# Patient Record
Sex: FEMALE | Race: WHITE | NOT HISPANIC OR LATINO | Employment: OTHER | URBAN - METROPOLITAN AREA
[De-identification: names, ages, dates, MRNs, and addresses within clinical notes are randomized per-mention and may not be internally consistent; named-entity substitution may affect disease eponyms.]

---

## 2017-09-18 ENCOUNTER — HISTORICAL (OUTPATIENT)
Dept: ADMINISTRATIVE | Facility: HOSPITAL | Age: 62
End: 2017-09-18

## 2017-09-20 LAB — FINAL CULTURE: NO GROWTH

## 2018-01-11 ENCOUNTER — HISTORICAL (OUTPATIENT)
Dept: ADMINISTRATIVE | Facility: HOSPITAL | Age: 63
End: 2018-01-11

## 2021-11-30 ENCOUNTER — HISTORICAL (OUTPATIENT)
Dept: ADMINISTRATIVE | Facility: HOSPITAL | Age: 66
End: 2021-11-30

## 2021-11-30 LAB
CHOLEST SERPL-MCNC: 155 MG/DL (ref 0–200)
CHOLEST/HDLC SERPL: 3 {RATIO}
HDLC SERPL-MCNC: 52 MG/DL (ref 35–60)
LDLC SERPL CALC-MCNC: 72 MG/DL (ref 0–129)
TRIGL SERPL-MCNC: 118 MG/DL (ref 30–150)
VLDLC SERPL CALC-MCNC: 23.6 MG/DL

## 2022-04-07 ENCOUNTER — HISTORICAL (OUTPATIENT)
Dept: ADMINISTRATIVE | Facility: HOSPITAL | Age: 67
End: 2022-04-07

## 2022-04-23 VITALS
OXYGEN SATURATION: 97 % | HEIGHT: 65 IN | BODY MASS INDEX: 28.57 KG/M2 | WEIGHT: 171.5 LBS | SYSTOLIC BLOOD PRESSURE: 130 MMHG | DIASTOLIC BLOOD PRESSURE: 82 MMHG

## 2022-05-01 NOTE — HISTORICAL [facility]
This is a historical note converted from Nathan. Formatting and pictures may have been removed.  Please reference Nathan for original formatting and attached multimedia. Chief Complaint  CPX/FASTING/PRE-OP CLEARANCE  History of Present Illness  Patient presents for wellness examination as well as preoperative clearance for cataract surgery.  She continues to have back pain.  She complains of achy joints all over and muscle aches.  She sleeps better; she takes 1/2 Xanax some times. Her hot flashes are much better.  Appetite is too good.  She has alcohol once to twice a year.  She has decreased tobacco to less 1/2?pack per day.  Colonoscopy: Dr Kimberly Marcos, 2018, follow up 5 years.  She does not exercise.  Oncologist: Dr Euceda; last office visit 11/2021. Next appointment February 2021.  Cardio: Dr Larsen; has echo scheduled for February.  Review of Systems  Constitutional:??no?weight gain,??no?weight loss,??no?fatigue, ?no?fever, ?no?chills, ?no?weakness, ?no?trouble sleeping  Eyes: ?no?vision loss/changes,??no?glasses or contacts,??no?pain,??no?redness,??no?blurry or double vision,??no?flashing lights,??no?glaucoma,??+?cataracts: need bilateral surgery  Last eye exam:? has right?cataract surgery scheduled for Monday  Neck: ?no?lymphadenopathy,??no?thyroid abnormalities,??no?bruits,??no?stiffness  Ears:??no?decreased hearing,??no?tinnitus,??no?earache,??no?drainage?  Nose:??no?congestion,??+?rhinorrhea, ?no?epistaxis,??no?sinus pressure, + allergies  Throat/Oral:??+sore throat,??no?hoarseness, ?no?dental caries,??no?gum bleeding,??no?oral lesions  Cardiovascular:??no?chest pain, palpitations, or tightness,??no?dyspnea with exertion,??no?orthopnea,??no?paroxysmal nocturnal dyspnea, + hypertension, + hypercholesteremia  Respiratory:??no?cough,??no?sputum,??no?hemoptysis,??no?dyspnea,??no?wheezing,??no?pleuritic chest pain?  Gastrointestinal:??no?abdominal  pain,??no?nausea,??no?vomiting,??no?heartburn,??no?dysphagia or odynophagia,??no?diarrhea,??no?constipation,??no?melena,??no?hematochezia,?no?jaundice  Urinary:??no?frequency,??no?urgency,??no?burning or pain,?no?hematuria,??no?incontinence,??no?hesitancy,??no?incomplete voiding,??no?flank pain,??no?nocturia  Musculoskeletal:?+?myalgias,??+?arthralgias; elbow, wrists, fingers, ankles, no?neck pain,??no?back pain,??+?swelling of extremities: left foot, h/o recurrent deep vein thrombosis  Skin:?no?rashes,??no?sores,??no?non-healing wounds  Neurologic:??no?headaches,??no?dizziness/lightheadedness,??no?tremors,??no?paresthesias,??no?seizures,??no?muscle weakness  Psychiatric:??no?depression/sadness,??no?anhedonia,??no?irritability,??no?suicidal ideations,??+?anxiety,??no?panic attacks  Endocrine:??no?hot or cold intolerance,??no?sweating,??no?polyuria,??no?polydipsia,??no?polyphagia  Hematologic:??no?bruising,??no?bleeding disorders, recurrent breast cancer, h/o recurrent deep venous thromboses  Physical Exam  Vitals & Measurements  T:?36.8? ?C (Oral)? HR:?76(Peripheral)? BP:?130/82? SpO2:?97%?  HT:?165.1?cm? HT:?165.10?cm? WT:?77.8?kg? WT:?77.800?kg? BMI:?28.54?  PHYSICAL EXAMINATION:  GENERAL: The patient is a well-developed, well-nourished white?female in no?apparent distress. She is alert and oriented x 4.  HEENT: Head is normocephalic and atraumatic. Extraocular muscles are intact. Pupils are equal, round, and reactive to light and accommodation. Nares: edematous mucosa/turbinates.??Mouth is well hydrated and without lesions. Mucous membranes are moist. Posterior pharynx clear of any exudate or lesions.  NECK: Supple. No carotid bruits.? No lymphadenopathy or thyromegaly.  LUNGS: Clear to auscultation.  HEART: Regular rate and rhythm without murmur, gallops or rubs.  ABDOMEN: Soft, nontender, and nondistended.? Positive bowel sounds.? No hepatosplenomegaly was noted.  EXTREMITIES: Without any cyanosis, clubbing,  rash, lesions or edema.  NEUROLOGIC: Cranial nerves II through XII are grossly intact.? No motor or sensory deficits.? Cerebellar function intact.  SKIN: No ulceration or induration present.  ?  ?  Assessment/Plan  1.?Wellness examination?Z00.00  Patient presents for wellness examination.  She also needs preoperative clearance for bilateral cataract surgery; she will have her right eye done?next week?and her left eye done in January.  Patient is followed regularly by Dr. Euceda.  Patient is on lifetime?anticoagulant therapy with Coumadin.  Patients cardiac status is followed by .  Patient declines any/all vaccinations.  Lipid profile pending.  Ordered:  Clinic Follow-Up Wellness, *Est. 11/30/22 3:00:00 CST, Order for future visit, Wellness examination, [facility]  Medicare Annual Wellness- Subsequent  PC, Wellness examination  Hypertension  Hypercholesteremia  Metastatic breast cancer  History of recurrent deep vein thrombosis (DVT)  Anxiety  Tobacco user, [facility] - [facility], 11/30/21 16:19:00 CST  ?  2.?Hypertension?I10  Well-controlled; continue current medications.  Ordered:  Medicare Annual Wellness- Subsequent  PC, Wellness examination  Hypertension  Hypercholesteremia  Metastatic breast cancer  History of recurrent deep vein thrombosis (DVT)  Anxiety  Tobacco user, [facility] - [facility], 11/30/21 16:19:00 CST  ?  3.?Hypercholesteremia?E78.00  ?Patient has been compliant with medication.  Lipid profile pending.  We will have patient hold her medication for 1 month and see if this improves her?aches and pains.  Ordered:  Lipid Panel, Routine collect, 11/30/21 16:26:00 CST, Blood, Stop date 11/30/21 16:26:00 CST, Lab Collect, Hypercholesteremia, 11/30/21 16:26:00 CST  Medicare Annual Wellness- Subsequent  PC, Wellness examination  Hypertension  Hypercholesteremia  Metastatic breast cancer  History of recurrent deep vein thrombosis (DVT)  Anxiety  Tobacco user, [facility] - [facility],  11/30/21 16:19:00 CST  ?  4.?Metastatic breast cancer?C50.919  Followed by .  She is on?letrozole.  She is currently off Herceptin;?heart function is being followed by Dr. Larsen. ?She will have an echo?cardiogram in February.? Results of echocardiogram?will determine if Herceptin is reinstituted.  Recent MRIs of her thoracic and lumbar spine?reveal?degenerative changes?and?loss of?vertebral height consistent with osteoporosis.  Ordered:  Medicare Annual Wellness- Subsequent  PC, Wellness examination  Hypertension  Hypercholesteremia  Metastatic breast cancer  History of recurrent deep vein thrombosis (DVT)  Anxiety  Tobacco user, [facility] - [facility], 11/30/21 16:19:00 CST  ?  5.?History of recurrent deep vein thrombosis (DVT)?Z86.718  ?Patient continues to be?on Coumadin;?she will be on lifetime therapy.  Ordered:  Medicare Annual Wellness- Subsequent  PC, Wellness examination  Hypertension  Hypercholesteremia  Metastatic breast cancer  History of recurrent deep vein thrombosis (DVT)  Anxiety  Tobacco user, [facility] - [facility], 11/30/21 16:19:00 CST  ?  6.?Anxiety?F41.9  All things considered, patient feels she is doing well.  Ordered:  Medicare Annual Wellness- Subsequent  PC, Wellness examination  Hypertension  Hypercholesteremia  Metastatic breast cancer  History of recurrent deep vein thrombosis (DVT)  Anxiety  Tobacco user, [facility] - [facility], 11/30/21 16:19:00 CST  ?  7.?Tobacco user?Z72.0  ?Patient encouraged to?decrease/stop usage.  Long-term risks of tobacco usage discussed with patient.  Ordered:  Medicare Annual Wellness- Subsequent  PC, Wellness examination  Hypertension  Hypercholesteremia  Metastatic breast cancer  History of recurrent deep vein thrombosis (DVT)  Anxiety  Tobacco user, [facility] - [facility], 11/30/21 16:19:00 CST  ?  8.?Immunization refused?Z28.21  ?Patient declines any/all vaccinations; benefits/potential risk/side effects discussed with  patient.  ?  Orders:  alPRAzolam, See Instructions, TAKE 1 TABLET BY MOUTH EVERY NIGHT AT BEDTIME AS NEEDED, # 30 tab(s), 5 Refill(s), Pharmacy: [id number], 165.1, cm, Height/Length Dosing, 11/30/21 15:29:00 CST, 77.8, kg, Weight Dosing, 11/30/21 15:29:00 CST  lisinopril, 20 mg = 1 tab(s), Oral, Daily, # 90 tab(s), 3 Refill(s), Pharmacy: [id number], 165.1, cm, Height/Length Dosing, 11/30/21 15:29:00 CST, 77.8, kg, Weight Dosing, 11/30/21 15:29:00 CST  metoprolol, 100 mg = 1 tab(s), Oral, Daily, # 90 tab(s), 3 Refill(s), Pharmacy: [id number], 165.1, cm, Height/Length Dosing, 11/30/21 15:29:00 CST, 77.8, kg, Weight Dosing, 11/30/21 15:29:00 CST  rosuvastatin, See Instructions, Take 1 tablet by mouth daily., # 90 tab(s), 3 Refill(s), Pharmacy: [id number], 165.1, cm, Height/Length Dosing, 11/30/21 15:29:00 CST, 77.8, kg, Weight Dosing, 11/30/21 15:29:00 CST  Referrals  Clinic Follow-Up Wellness, *Est. 11/30/22 3:00:00 CST, Order for future visit, Wellness examination, [facility]   Problem List/Past Medical History  Ongoing  Anxiety  History of recurrent deep vein thrombosis (DVT)  Hypercholesteremia  Hypertension  Immunization refused  Metastatic breast cancer  Historical  No qualifying data  Procedure/Surgical History  Colonoscopy (04/01/2018)  Biopsy of bone (02.2018)  Bilateral oophorectomy (2018)   Medications  alPRAzolam 0.5 mg oral tablet, See Instructions, 5 refills  gabapentin 400 mg oral capsule, 400 mg= 1 cap(s), Oral, TID  letrozole 2.5 mg oral tablet, 2.5 mg= 1 tab(s), Oral, Daily  lisinopril 20 mg oral tablet, 20 mg= 1 tab(s), Oral, Daily, 3 refills  Metoprolol Succinate  mg oral tablet extended release, 100 mg= 1 tab(s), Oral, Daily, 3 refills  rosuvastatin 20 mg oral tablet, See Instructions, 3 refills  WARFARIN SODIUM 4 MG TABLET, 4 mg= 1 tab(s), Oral, qPM  Allergies  No Known Medication Allergies  Social History  Abuse/Neglect  No, 11/30/2021  No,  09/16/2020  Alcohol  Current, Liquor, 1-2 times per year, 11/30/2021  Current, 1-2 times per year, 09/16/2020  Employment/School  Work/School description: BUSINESS OWNER., 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 Living situation: Home/Independent. Monitoring, 11/30/2021  Lives with Spouse. Living situation: Home/Independent., 09/16/2020  Nutrition/Health  Regular, Good, 11/30/2021  Regular, Good, 09/16/2020  Substance Use  Never, 11/30/2021  Current, CBD Gummies, Daily, 09/16/2020  Tobacco  5-9 cigarettes (between 1/4 to 1/2 pack)/day in last 30 days, No, 11/30/2021  10 or more cigarettes (1/2 pack or more)/day in last 30 days, Cigarettes, No, 10 per day. 29 Years (Age started)., 09/16/2020  Family History  Prostate cancer: Father.  Health Maintenance  Health Maintenance  ???Pending?(in the next year)  ??? ??OverDue  ??? ? ? ?Smoking Cessation due??01/01/21??Variable frequency  ??? ? ? ?Advance Directive due??01/02/21??and every 1??year(s)  ??? ? ? ?Alcohol Misuse Screening due??01/02/21??and every 1??year(s)  ??? ? ? ?Cognitive Screening due??01/02/21??and every 1??year(s)  ??? ? ? ?Fall Risk Assessment due??01/02/21??and every 1??year(s)  ??? ? ? ?Functional Assessment due??01/02/21??and every 1??year(s)  ??? ??Due?  ??? ? ? ?ADL Screening due??11/30/21??and every 1??year(s)  ??? ? ? ?Aspirin Therapy for CVD Prevention due??11/30/21??and every 1??year(s)  ??? ? ? ?Bone Density Screening due??11/30/21??Variable frequency  ??? ? ? ?Breast Cancer Screening due??11/30/21??Unknown Frequency  ??? ? ? ?Depression Screening due??11/30/21??Unknown Frequency  ??? ? ? ?Diabetes Screening due??11/30/21??Unknown Frequency  ??? ? ? ?Hypertension Management-Education due??11/30/21??and every 1??year(s)  ??? ? ? ?Hypertension Management-BMP due??11/30/21??Unknown Frequency  ??? ? ? ?Lung Cancer Screening due??11/30/21??and every  1??year(s)  ??? ? ? ?Pneumococcal Vaccine due??11/30/21??Unknown Frequency  ??? ? ? ?Tetanus Vaccine due??11/30/21??and every 10??year(s)  ??? ? ? ?Zoster Vaccine due??11/30/21??Unknown Frequency  ??? ??Due In Future?  ??? ? ? ?Obesity Screening not due until??01/01/22??and every 1??year(s)  ???Satisfied?(in the past 1 year)  ??? ??Satisfied?  ??? ? ? ?Blood Pressure Screening on??11/30/21.??Satisfied by Butch Stokes LPN  ??? ? ? ?Body Mass Index Check on??11/30/21.??Satisfied by Butch Stokes LPN  ??? ? ? ?Hypertension Management-Blood Pressure on??11/30/21.??Satisfied by Butch Stokes LPN  ??? ? ? ?Influenza Vaccine on??11/30/21.??Satisfied by Butch Stokes LPN  ??? ? ? ?Medicare Annual Wellness Exam on??11/30/21.??Satisfied by Chi Godwin MD  ??? ? ? ?Obesity Screening on??11/30/21.??Satisfied by Butch Stokes LPN  ?

## 2022-11-10 ENCOUNTER — TELEPHONE (OUTPATIENT)
Dept: NEUROSURGERY | Facility: CLINIC | Age: 67
End: 2022-11-10
Payer: MEDICARE

## 2022-11-10 DIAGNOSIS — G93.89 CEREBELLAR MASS: Primary | ICD-10-CM

## 2022-11-10 NOTE — TELEPHONE ENCOUNTER
She needs to be seen next week.  Either in [facility]'s schedule or mine on Wednesday.  She has cervical complaints.  Cervical MRI was obtained.  Please get that as well.

## 2022-11-10 NOTE — TELEPHONE ENCOUNTER
----- Message from Ellie Lainez MA sent at 11/10/2022 11:46 AM CST -----  Regarding: REFERRAL PROCESS-BRAIN METS  Patient was referred to Dr. Gomez by Dr. Euceda for brain mets. Imaging is in Epic, please review for severity and let me know where to schedule. Thanks!

## 2022-11-14 ENCOUNTER — OFFICE VISIT (OUTPATIENT)
Dept: NEUROSURGERY | Facility: CLINIC | Age: 67
End: 2022-11-14
Payer: MEDICARE

## 2022-11-14 VITALS
BODY MASS INDEX: 24.27 KG/M2 | WEIGHT: 151 LBS | DIASTOLIC BLOOD PRESSURE: 76 MMHG | SYSTOLIC BLOOD PRESSURE: 124 MMHG | HEIGHT: 66 IN | HEART RATE: 77 BPM

## 2022-11-14 DIAGNOSIS — G93.89 CEREBELLAR MASS: ICD-10-CM

## 2022-11-14 PROCEDURE — 99205 OFFICE O/P NEW HI 60 MIN: CPT | Mod: ,,, | Performed by: NEUROLOGICAL SURGERY

## 2022-11-14 PROCEDURE — 99205 PR OFFICE/OUTPT VISIT, NEW, LEVL V, 60-74 MIN: ICD-10-PCS | Mod: ,,, | Performed by: NEUROLOGICAL SURGERY

## 2022-11-14 RX ORDER — LISINOPRIL 20 MG/1
20 TABLET ORAL DAILY
COMMUNITY
Start: 2021-11-30 | End: 2022-12-19

## 2022-11-14 RX ORDER — FERROUS SULFATE, DRIED 160(50) MG
1 TABLET, EXTENDED RELEASE ORAL DAILY
COMMUNITY

## 2022-11-14 RX ORDER — NIRMATRELVIR AND RITONAVIR 300-100 MG
KIT ORAL
COMMUNITY
Start: 2022-07-12 | End: 2022-11-29 | Stop reason: CLARIF

## 2022-11-14 RX ORDER — METOPROLOL SUCCINATE 25 MG/1
25 TABLET, EXTENDED RELEASE ORAL DAILY
COMMUNITY
End: 2023-06-30 | Stop reason: SDUPTHER

## 2022-11-14 RX ORDER — LORATADINE 10 MG
10 TABLET,DISINTEGRATING ORAL DAILY
COMMUNITY

## 2022-11-14 RX ORDER — ROSUVASTATIN CALCIUM 20 MG/1
20 TABLET, COATED ORAL DAILY
COMMUNITY
Start: 2021-11-30 | End: 2022-11-29 | Stop reason: CLARIF

## 2022-11-14 RX ORDER — WARFARIN 4 MG/1
4 TABLET ORAL DAILY
COMMUNITY
Start: 2022-06-20

## 2022-11-14 RX ORDER — DEXAMETHASONE 4 MG/1
1 TABLET ORAL EVERY 8 HOURS
Status: ON HOLD | COMMUNITY
Start: 2022-11-07 | End: 2022-12-07 | Stop reason: SDUPTHER

## 2022-11-14 RX ORDER — GABAPENTIN 100 MG/1
100 CAPSULE ORAL 2 TIMES DAILY
COMMUNITY

## 2022-11-14 NOTE — PROGRESS NOTES
[facility]  Neurosurgery      Kaykay Crespo   [id number]   1955     SUBJECTIVE:     History of Present Illness:  Patient is a 67 y.o. female who presents with a brain mass.  She has history of breast cancer diagnosed in 1999.  In 2018, she was found to have mets to the right ovary and right iliac crest.  She reports multiple compression fractures in the back about one year later.  More recently, she developed pain down the left arm to the 4th and 5th digits of the hand.  She has intermittent shocking in the left forearm and hand.  She does not feel the hand is weak and has not noticed dropping anything.  Her pain increases with looking up.  She discussed her symptoms with her oncologist, Dr. Euceda.  MRI of the cervical spine was obtained showing a mass in the right occipital region.  MRI brain was then performed confirming a lesion in the right occipital and temporal region with surrounding edema.  She was started on oral steroids and gabapentin, which have helped the left upper extremity symptoms.  She has been referred here for neurosurgical evaluation and care.      Past Medical History:   Diagnosis Date    Anxiety disorder, unspecified     Cerebellar mass     Compression fracture of L1 lumbar vertebra     History of DVT (deep vein thrombosis)     HLD (hyperlipidemia)     HTN (hypertension)     Metastatic breast cancer     Osteoporosis       Past Surgical History:   Procedure Laterality Date    BONE BIOPSY      OOPHORECTOMY Bilateral       Current Outpatient Medications   Medication Sig Dispense Refill    ALPRAZolam (XANAX) 0.5 MG tablet TAKE ONE TABLET BY MOUTH AT BEDTIME 30 tablet 5    calcium-vitamin D3 (OS-KENA 500 + D3) 500 mg-5 mcg (200 unit) per tablet Take 1 tablet by mouth Daily.      dexAMETHasone (DECADRON) 4 MG Tab 3 (three) times daily.      gabapentin (NEURONTIN) 100 MG capsule Take 100 mg by mouth 2 (two) times daily.      lisinopriL (PRINIVIL,ZESTRIL) 20 MG tablet Take 20  mg by mouth Daily.      loratadine (CLARITIN REDITABS) 10 mg dissolvable tablet Take 10 mg by mouth Daily.      metoprolol succinate (TOPROL-XL) 25 MG 24 hr tablet Take 25 mg by mouth once daily.      warfarin (COUMADIN) 4 MG tablet Daily.      PAXLOVID, EUA, 300 mg (150 mg x 2)-100 mg copackaged tablets (EUA) Take by mouth.      rosuvastatin (CRESTOR) 20 MG tablet Daily.       No current facility-administered medications for this visit.     Review of patient's allergies indicates:  No Known Allergies   Social History     Tobacco Use    Smoking status: Every Day     Types: Cigarettes    Smokeless tobacco: Never   Substance Use Topics    Alcohol use: Not Currently      No family history on file.      Review of Systems:    Pertinent items are noted in HPI.      OBJECTIVE:     Vital Signs (Most Recent):  Pulse: 77 (11/14/22 1405)  BP: 124/76 (11/14/22 1405)  Body mass index is 24.37 kg/m².    Physical Exam:  General:  healthy, alert, no distress, cooperative    Head:  Normocephalic, without obvious abnormality, atraumatic    Lungs:   Breathing is quiet, non-lablored    Neurological:    Oriented to Person, Place, Time   Speech:  normal  Memory, cognition, and affect are appropriate.  Extraocular movements are intact.  Movements of facial expression are intact and symmetric.  Visual Fields are full to confrontation bilaterally, although difficult to test  Motor Strength: Moves all extremities spontaneously with good tone.  No abnormal movements seen.  normal 5/5 strength in all tested muscle groups and no pronator drift  Sensation is intact.  tandem gait was normal and can stand on each foot independently for 2-3 seconds  Gait is normal    MRI shows a sizable, likely dural based, posterior temporo-occipital fairly densely enhancing mass with a large amount of surrounding vasogenic edema.  She is remarkably minimally symptomatic think we can reduce her Decadron to 4 mg b.i.d. They would like to go ahead with surgery, but  she still needs to talk to the people at [facility]    ASSESSMENT/PLAN:     1. Cerebellar mass  - Decrease Decadron to 4mg BID  - MRI brain w/ contrast, [facility]  - Right craniotomy for tumor          I, Dr. Theron Gomez, personally performed the services described in this documentation. All medical record entries made by the scribe, Nanda Oliveira RN, were at my direction and in my presence.  I have reviewed the chart and agree that the record reflects my personal performance and is accurate and complete. Theron Gomez MD.  2:12 PM 11/14/2022       Theron Gomez MD FACS [facility]

## 2022-11-15 DIAGNOSIS — G93.89 CEREBELLAR MASS: Primary | ICD-10-CM

## 2022-11-21 ENCOUNTER — TELEPHONE (OUTPATIENT)
Dept: NEUROSURGERY | Facility: CLINIC | Age: 67
End: 2022-11-21
Payer: MEDICARE

## 2022-11-21 NOTE — TELEPHONE ENCOUNTER
----- Message from Felisha Mckeon AGACNP-BC sent at 11/20/2022  2:10 PM CST -----  Regarding: appt  Can we move her preop appt on 11/28/2022 from 9:00 to the afternoon please? Nanda out next week. I have to pick my dogs up from vet that morning at 8 in [place] (that's the earliest I can get them). I probably won't make it to the office until 9:15/920 and I am preopping sylvia's first patient after he sees them so I will be behind.  If she can't do that afternoon, sarah sometime on Wednesday 11/30. -Felisha

## 2022-11-21 NOTE — TELEPHONE ENCOUNTER
Patient wants to hold off on sx right now until after she sees MD Coon will call when she's ready to schedule; I cx preop.

## 2022-11-22 NOTE — TELEPHONE ENCOUNTER
I spoke with the patient.  She will notify us once she has an appointment with MERVIN so we have a time frame for possible rescheduling of surgery.  She will let us know if anything changes or she wants to be added back on.  I spoke with Alix and let her know the case was cancelled for the time being.

## 2022-11-28 ENCOUNTER — TELEPHONE (OUTPATIENT)
Dept: NEUROSURGERY | Facility: CLINIC | Age: 67
End: 2022-11-28

## 2022-11-28 NOTE — TELEPHONE ENCOUNTER
----- Message from Nanda Scott LPN sent at 11/15/2022  4:23 PM CST -----  Regarding: ONCOLOGY CLEARANCE  Faxed request.  ----- Message -----  From: Nanda Scott LPN  Sent: 11/15/2022   2:49 PM CST  To: Nanda Scott LPN  Subject: needs onc clearance                              [facility]- coumadin for clots  Sx 11/29/22. [place]?

## 2022-11-29 ENCOUNTER — TELEPHONE (OUTPATIENT)
Dept: NEUROSURGERY | Facility: CLINIC | Age: 67
End: 2022-11-29
Payer: MEDICARE

## 2022-11-29 DIAGNOSIS — I10 HYPERTENSION, UNSPECIFIED TYPE: ICD-10-CM

## 2022-11-29 DIAGNOSIS — Z79.01 LONG TERM (CURRENT) USE OF ANTICOAGULANTS: ICD-10-CM

## 2022-11-29 DIAGNOSIS — Z86.718 HISTORY OF DVT (DEEP VEIN THROMBOSIS): ICD-10-CM

## 2022-11-29 DIAGNOSIS — G93.89 CEREBELLAR MASS: Primary | ICD-10-CM

## 2022-11-29 NOTE — TELEPHONE ENCOUNTER
melecio Baeza. She has decided to r/s. Added her on for 12/6 for sx, preop 11/30. Dr. Euceda has cleared her to be off of coumadin for 5 days prior but she needs to be bridged with Lovenox. Patient is calling his office now to get that set up (clearance in chart).

## 2022-11-30 ENCOUNTER — OFFICE VISIT (OUTPATIENT)
Dept: NEUROSURGERY | Facility: CLINIC | Age: 67
End: 2022-11-30
Payer: MEDICARE

## 2022-11-30 ENCOUNTER — LAB VISIT (OUTPATIENT)
Dept: LAB | Facility: HOSPITAL | Age: 67
End: 2022-11-30
Attending: NEUROLOGICAL SURGERY
Payer: MEDICARE

## 2022-11-30 VITALS
TEMPERATURE: 99 F | SYSTOLIC BLOOD PRESSURE: 127 MMHG | BODY MASS INDEX: 23.46 KG/M2 | RESPIRATION RATE: 16 BRPM | DIASTOLIC BLOOD PRESSURE: 79 MMHG | HEIGHT: 66 IN | WEIGHT: 146 LBS | HEART RATE: 90 BPM

## 2022-11-30 DIAGNOSIS — G93.89 BRAIN MASS: ICD-10-CM

## 2022-11-30 DIAGNOSIS — Z79.01 LONG TERM (CURRENT) USE OF ANTICOAGULANTS: ICD-10-CM

## 2022-11-30 DIAGNOSIS — G93.89 CEREBELLAR MASS: ICD-10-CM

## 2022-11-30 DIAGNOSIS — Z86.718 HISTORY OF DVT (DEEP VEIN THROMBOSIS): ICD-10-CM

## 2022-11-30 LAB
ALBUMIN SERPL-MCNC: 3.2 GM/DL (ref 3.4–4.8)
ALBUMIN/GLOB SERPL: 1.3 RATIO (ref 1.1–2)
ALP SERPL-CCNC: 58 UNIT/L (ref 40–150)
ALT SERPL-CCNC: 21 UNIT/L (ref 0–55)
APTT PPP: 25.3 SECONDS (ref 23.2–33.7)
AST SERPL-CCNC: 17 UNIT/L (ref 5–34)
BASOPHILS # BLD AUTO: 0.02 X10(3)/MCL (ref 0–0.2)
BASOPHILS NFR BLD AUTO: 0.2 %
BILIRUBIN DIRECT+TOT PNL SERPL-MCNC: 0.3 MG/DL
BUN SERPL-MCNC: 23.2 MG/DL (ref 9.8–20.1)
CALCIUM SERPL-MCNC: 9 MG/DL (ref 8.4–10.2)
CHLORIDE SERPL-SCNC: 100 MMOL/L (ref 98–107)
CO2 SERPL-SCNC: 25 MMOL/L (ref 23–31)
CREAT SERPL-MCNC: 0.76 MG/DL (ref 0.55–1.02)
EOSINOPHIL # BLD AUTO: 0 X10(3)/MCL (ref 0–0.9)
EOSINOPHIL NFR BLD AUTO: 0 %
ERYTHROCYTE [DISTWIDTH] IN BLOOD BY AUTOMATED COUNT: 13.7 % (ref 11.5–17)
GFR SERPLBLD CREATININE-BSD FMLA CKD-EPI: >60 MLS/MIN/1.73/M2
GLOBULIN SER-MCNC: 2.5 GM/DL (ref 2.4–3.5)
GLUCOSE SERPL-MCNC: 158 MG/DL (ref 82–115)
HCT VFR BLD AUTO: 41.5 % (ref 37–47)
HGB BLD-MCNC: 13.8 GM/DL (ref 12–16)
IMM GRANULOCYTES # BLD AUTO: 0.09 X10(3)/MCL (ref 0–0.04)
IMM GRANULOCYTES NFR BLD AUTO: 1 %
INR BLD: 1.7 (ref 0–1.3)
LYMPHOCYTES # BLD AUTO: 1.02 X10(3)/MCL (ref 0.6–4.6)
LYMPHOCYTES NFR BLD AUTO: 10.8 %
MCH RBC QN AUTO: 32.9 PG (ref 27–31)
MCHC RBC AUTO-ENTMCNC: 33.3 MG/DL (ref 33–36)
MCV RBC AUTO: 98.8 FL (ref 80–94)
MONOCYTES # BLD AUTO: 0.29 X10(3)/MCL (ref 0.1–1.3)
MONOCYTES NFR BLD AUTO: 3.1 %
NEUTROPHILS # BLD AUTO: 8 X10(3)/MCL (ref 2.1–9.2)
NEUTROPHILS NFR BLD AUTO: 84.9 %
NRBC BLD AUTO-RTO: 0 %
PLATELET # BLD AUTO: 306 X10(3)/MCL (ref 130–400)
PMV BLD AUTO: 9.3 FL (ref 7.4–10.4)
POTASSIUM SERPL-SCNC: 4.1 MMOL/L (ref 3.5–5.1)
PROT SERPL-MCNC: 5.7 GM/DL (ref 5.8–7.6)
PROTHROMBIN TIME: 19.7 SECONDS (ref 12.5–14.5)
RBC # BLD AUTO: 4.2 X10(6)/MCL (ref 4.2–5.4)
SODIUM SERPL-SCNC: 137 MMOL/L (ref 136–145)
WBC # SPEC AUTO: 9.5 X10(3)/MCL (ref 4.5–11.5)

## 2022-11-30 PROCEDURE — 99212 OFFICE O/P EST SF 10 MIN: CPT | Mod: ,,, | Performed by: NURSE PRACTITIONER

## 2022-11-30 PROCEDURE — 36415 COLL VENOUS BLD VENIPUNCTURE: CPT

## 2022-11-30 PROCEDURE — 99212 PR OFFICE/OUTPT VISIT, EST, LEVL II, 10-19 MIN: ICD-10-PCS | Mod: ,,, | Performed by: NURSE PRACTITIONER

## 2022-11-30 PROCEDURE — 85025 COMPLETE CBC W/AUTO DIFF WBC: CPT

## 2022-11-30 PROCEDURE — 85610 PROTHROMBIN TIME: CPT

## 2022-11-30 PROCEDURE — 85730 THROMBOPLASTIN TIME PARTIAL: CPT

## 2022-11-30 PROCEDURE — 80053 COMPREHEN METABOLIC PANEL: CPT

## 2022-11-30 RX ORDER — OXYCODONE HYDROCHLORIDE 5 MG/1
5 TABLET ORAL
Status: ON HOLD | COMMUNITY
End: 2022-12-07 | Stop reason: HOSPADM

## 2022-11-30 RX ORDER — NYSTATIN 100000 [USP'U]/ML
500000 SUSPENSION ORAL
COMMUNITY
Start: 2022-11-22 | End: 2023-06-30

## 2022-11-30 NOTE — PATIENT INSTRUCTIONS
-STOP coumadin on 12/1/2022. Hold lovenox after morning dose on 12/5/2022.   -Take Metoprolol and decadron the morning of surgery with a sip of water. Otherwise, nothing to eat or drink after midnight the night before surgery.

## 2022-11-30 NOTE — H&P (VIEW-ONLY)
[facility]  Office Visit  Neurosurgery  Kaykay Crespo  [id number]  1955    HPI:  The patient presents today for pre-operative appointment.  Patient is a 67-year-old female with a history of breast cancer was diagnosed in 1999.  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  She reports multiple compression fractures in the back about a year later.  Recently, she developed pain down the left arm to the 4th and 5th digits of the left hand.  She is intermittent shocking pain in the left forearm and hand.  She denies any weakness.  She does not drop things.  Her pain increases when looking up.  She discussed her symptoms with her oncologist Dr. Euceda.  MRI of the cervical spine was obtained revealing mass in the right occipital region.  MRI brain was then performed confirming a lesion in the right occipital temporal region with surrounding edema.  She was started on steroids and gabapentin.  She had improvement in her left upper extremity symptoms after starting the medicines.  She was referred to Dr. Gomez for neurosurgical evaluation. She was seen in the office on 11/14/2022 and surgery was discussed. She had an appointment at [facility] recently. She has decided to proceed with surgery with Dr. Gomez. She presents today for her pre-operative appointment.     The patient continues with some left arm pain, which is unchanged. She denies any new symptoms since her appointment with Dr. Gomez a few weeks ago. She continues with some memory issues. She is present today with her . She is ready to proceed with surgery as planned.     She is on Coumadin. She was instructed to bridge with lovenox once daily by Dr. Euceda.     Review of patient's allergies indicates:  No Known Allergies  Current Outpatient Medications   Medication Sig Dispense Refill    ALPRAZolam (XANAX) 0.5 MG tablet TAKE ONE TABLET BY MOUTH AT BEDTIME (Patient taking differently: Take 0.25  mg by mouth nightly as needed.) 30 tablet 5    calcium-vitamin D3 (OS-KENA 500 + D3) 500 mg-5 mcg (200 unit) per tablet Take 1 tablet by mouth Daily.      dexAMETHasone (DECADRON) 4 MG Tab Take 1 mg by mouth every 8 (eight) hours.      gabapentin (NEURONTIN) 100 MG capsule Take 100 mg by mouth 2 (two) times daily.      lisinopriL (PRINIVIL,ZESTRIL) 20 MG tablet Take 20 mg by mouth Daily.      loratadine (CLARITIN REDITABS) 10 mg dissolvable tablet Take 10 mg by mouth Daily.      metoprolol succinate (TOPROL-XL) 25 MG 24 hr tablet Take 25 mg by mouth once daily.      nystatin (MYCOSTATIN) 100,000 unit/mL suspension Take 500,000 Units by mouth.      warfarin (COUMADIN) 4 MG tablet Take 4 mg by mouth Daily.      oxyCODONE (ROXICODONE) 5 MG immediate release tablet Take 5 mg by mouth.       No current facility-administered medications for this visit.     Patient Active Problem List    Diagnosis Date Noted    Brain mass 12/05/2022       Past Medical History:   Diagnosis Date    Anxiety disorder, unspecified     Cerebellar mass     Compression fracture of L1 lumbar vertebra     History of DVT (deep vein thrombosis)     HLD (hyperlipidemia)     HTN (hypertension)     Metastatic breast cancer     Osteoporosis     PONV (postoperative nausea and vomiting)      Past Surgical History:   Procedure Laterality Date    BONE BIOPSY      CATARACT EXTRACTION Left     COLONOSCOPY      ESOPHAGOGASTRODUODENOSCOPY      LASIK Bilateral     MASTECTOMY Right     with lymph nodes    OOPHORECTOMY Bilateral 2018    SPLENECTOMY      TONSILLECTOMY, ADENOIDECTOMY      TUBAL LIGATION  1986     Social History     Tobacco Use    Smoking status: Every Day     Packs/day: 0.50     Types: Cigarettes    Smokeless tobacco: Never   Substance Use Topics    Alcohol use: Not Currently     Comment: occasionally     No family history on file.    Vital Signs  Temp: 98.6 °F (37 °C)  Temp src: Oral  Pulse: 90  Resp: 16  BP: 127/79  BP Location: Left arm  Pain Score:  "  2  Height and Weight  Height: 5' 6" (167.6 cm)  Weight: 66.2 kg (146 lb)  BSA (Calculated - sq m): 1.76 sq meters  BMI (Calculated): 23.6  Weight in (lb) to have BMI = 25: 154.6]    ROS:  Review of Systems   Musculoskeletal:  Positive for myalgias and neck pain.   Neurological:  Positive for numbness.   Psychiatric/Behavioral:          Memory issues   All other systems reviewed and are negative.    Vitals within last 24hrs:  Vitals:    11/30/22 1532   BP: 127/79   Pulse: 90   Resp: 16   Temp: 98.6 °F (37 °C)       Physical Exam:  General: well developed, well nourished, no distress.   Head: normocephalic, atraumatic  Respiratory: respiration non-labored; clear to auscultation  Cardiac: RRR, No murmur  GI: abd soft, non-tender, non-distended  Pulses: 2+ and symmetric radial and dorsalis pedis. No lower extremity edema  Neurologic: Alert and oriented. Thought content appropriate.  GCS: Motor: 6/Verbal: 5/Eyes: 4 GCS Total: 15  Mental Status: Awake, Alert, Oriented x3  Cranial nerves: face symmetric, tongue midline, CN II-XII grossly intact.   Eyes: pupils equal, round, reactive to light with accomodation, EOMI.   Sensory: intact to light touch throughout  Motor Strength:Moves all extremities spontaneously with good tone.  5/5 strength upper and lower extremities. No abnormal movements seen.   DTR's - 2 + and symmetric in UE and LE  Finger-to-nose: Intact bilaterally  Gait: normal  Tandem Gait: No difficulty            Assessment/Plan:  Problem List Items Addressed This Visit          Neuro    Brain mass     PLAN  The nature of the procedure, as well as its attendant risks, were discussed in detail with the patient.  All questions were answered.  They are agreement with proceeding with surgery as planned, and are tentatively scheduled for right craniotomy with excision of tumor on 12/6/2022.        COOPER Wilcox-AMIRAP    "

## 2022-11-30 NOTE — PROGRESS NOTES
[facility]  Office Visit  Neurosurgery  Kaykay Crespo  [id number]  1955    HPI:  The patient presents today for pre-operative appointment.  Patient is a 67-year-old female with a history of breast cancer was diagnosed in 1999.  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  She reports multiple compression fractures in the back about a year later.  Recently, she developed pain down the left arm to the 4th and 5th digits of the left hand.  She is intermittent shocking pain in the left forearm and hand.  She denies any weakness.  She does not drop things.  Her pain increases when looking up.  She discussed her symptoms with her oncologist Dr. Euceda.  MRI of the cervical spine was obtained revealing mass in the right occipital region.  MRI brain was then performed confirming a lesion in the right occipital temporal region with surrounding edema.  She was started on steroids and gabapentin.  She had improvement in her left upper extremity symptoms after starting the medicines.  She was referred to Dr. Gomez for neurosurgical evaluation. She was seen in the office on 11/14/2022 and surgery was discussed. She had an appointment at [facility] recently. She has decided to proceed with surgery with Dr. Gomez. She presents today for her pre-operative appointment.     The patient continues with some left arm pain, which is unchanged. She denies any new symptoms since her appointment with Dr. Gomez a few weeks ago. She continues with some memory issues. She is present today with her . She is ready to proceed with surgery as planned.     She is on Coumadin. She was instructed to bridge with lovenox once daily by Dr. Euceda.     Review of patient's allergies indicates:  No Known Allergies  Current Outpatient Medications   Medication Sig Dispense Refill    ALPRAZolam (XANAX) 0.5 MG tablet TAKE ONE TABLET BY MOUTH AT BEDTIME (Patient taking differently: Take 0.25  mg by mouth nightly as needed.) 30 tablet 5    calcium-vitamin D3 (OS-KENA 500 + D3) 500 mg-5 mcg (200 unit) per tablet Take 1 tablet by mouth Daily.      dexAMETHasone (DECADRON) 4 MG Tab Take 1 mg by mouth every 8 (eight) hours.      gabapentin (NEURONTIN) 100 MG capsule Take 100 mg by mouth 2 (two) times daily.      lisinopriL (PRINIVIL,ZESTRIL) 20 MG tablet Take 20 mg by mouth Daily.      loratadine (CLARITIN REDITABS) 10 mg dissolvable tablet Take 10 mg by mouth Daily.      metoprolol succinate (TOPROL-XL) 25 MG 24 hr tablet Take 25 mg by mouth once daily.      nystatin (MYCOSTATIN) 100,000 unit/mL suspension Take 500,000 Units by mouth.      warfarin (COUMADIN) 4 MG tablet Take 4 mg by mouth Daily.      oxyCODONE (ROXICODONE) 5 MG immediate release tablet Take 5 mg by mouth.       No current facility-administered medications for this visit.     Patient Active Problem List    Diagnosis Date Noted    Brain mass 12/05/2022       Past Medical History:   Diagnosis Date    Anxiety disorder, unspecified     Cerebellar mass     Compression fracture of L1 lumbar vertebra     History of DVT (deep vein thrombosis)     HLD (hyperlipidemia)     HTN (hypertension)     Metastatic breast cancer     Osteoporosis     PONV (postoperative nausea and vomiting)      Past Surgical History:   Procedure Laterality Date    BONE BIOPSY      CATARACT EXTRACTION Left     COLONOSCOPY      ESOPHAGOGASTRODUODENOSCOPY      LASIK Bilateral     MASTECTOMY Right     with lymph nodes    OOPHORECTOMY Bilateral 2018    SPLENECTOMY      TONSILLECTOMY, ADENOIDECTOMY      TUBAL LIGATION  1986     Social History     Tobacco Use    Smoking status: Every Day     Packs/day: 0.50     Types: Cigarettes    Smokeless tobacco: Never   Substance Use Topics    Alcohol use: Not Currently     Comment: occasionally     No family history on file.    Vital Signs  Temp: 98.6 °F (37 °C)  Temp src: Oral  Pulse: 90  Resp: 16  BP: 127/79  BP Location: Left arm  Pain Score:  "  2  Height and Weight  Height: 5' 6" (167.6 cm)  Weight: 66.2 kg (146 lb)  BSA (Calculated - sq m): 1.76 sq meters  BMI (Calculated): 23.6  Weight in (lb) to have BMI = 25: 154.6]    ROS:  Review of Systems   Musculoskeletal:  Positive for myalgias and neck pain.   Neurological:  Positive for numbness.   Psychiatric/Behavioral:          Memory issues   All other systems reviewed and are negative.    Vitals within last 24hrs:  Vitals:    11/30/22 1532   BP: 127/79   Pulse: 90   Resp: 16   Temp: 98.6 °F (37 °C)       Physical Exam:  General: well developed, well nourished, no distress.   Head: normocephalic, atraumatic  Respiratory: respiration non-labored; clear to auscultation  Cardiac: RRR, No murmur  GI: abd soft, non-tender, non-distended  Pulses: 2+ and symmetric radial and dorsalis pedis. No lower extremity edema  Neurologic: Alert and oriented. Thought content appropriate.  GCS: Motor: 6/Verbal: 5/Eyes: 4 GCS Total: 15  Mental Status: Awake, Alert, Oriented x3  Cranial nerves: face symmetric, tongue midline, CN II-XII grossly intact.   Eyes: pupils equal, round, reactive to light with accomodation, EOMI.   Sensory: intact to light touch throughout  Motor Strength:Moves all extremities spontaneously with good tone.  5/5 strength upper and lower extremities. No abnormal movements seen.   DTR's - 2 + and symmetric in UE and LE  Finger-to-nose: Intact bilaterally  Gait: normal  Tandem Gait: No difficulty            Assessment/Plan:  Problem List Items Addressed This Visit          Neuro    Brain mass     PLAN  The nature of the procedure, as well as its attendant risks, were discussed in detail with the patient.  All questions were answered.  They are agreement with proceeding with surgery as planned, and are tentatively scheduled for right craniotomy with excision of tumor on 12/6/2022.        COOPER Wilcox-AMIRAP    "

## 2022-12-01 ENCOUNTER — ANESTHESIA EVENT (OUTPATIENT)
Dept: SURGERY | Facility: HOSPITAL | Age: 67
DRG: 025 | End: 2022-12-01
Payer: MEDICARE

## 2022-12-05 ENCOUNTER — TELEPHONE (OUTPATIENT)
Dept: NEUROSURGERY | Facility: CLINIC | Age: 67
End: 2022-12-05
Payer: MEDICARE

## 2022-12-05 DIAGNOSIS — C79.31 BRAIN METASTASES: ICD-10-CM

## 2022-12-05 DIAGNOSIS — G93.89 CEREBELLAR MASS: Primary | ICD-10-CM

## 2022-12-05 PROBLEM — D49.6 NEOPLASM OF BRAIN CAUSING MASS EFFECT ON ADJACENT STRUCTURES: Status: ACTIVE | Noted: 2022-12-05

## 2022-12-05 PROBLEM — D49.6 NEOPLASM OF BRAIN CAUSING MASS EFFECT ON ADJACENT STRUCTURES: Status: RESOLVED | Noted: 2022-12-05 | Resolved: 2022-12-05

## 2022-12-05 RX ORDER — MUPIROCIN 20 MG/G
1 OINTMENT TOPICAL 2 TIMES DAILY
Status: CANCELLED | OUTPATIENT
Start: 2022-12-05 | End: 2022-12-06

## 2022-12-06 ENCOUNTER — ANESTHESIA (OUTPATIENT)
Dept: SURGERY | Facility: HOSPITAL | Age: 67
DRG: 025 | End: 2022-12-06
Payer: MEDICARE

## 2022-12-06 ENCOUNTER — HOSPITAL ENCOUNTER (INPATIENT)
Facility: HOSPITAL | Age: 67
LOS: 1 days | Discharge: HOME HEALTH | DRG: 025 | End: 2022-12-07
Attending: NEUROLOGICAL SURGERY | Admitting: NEUROLOGICAL SURGERY
Payer: MEDICARE

## 2022-12-06 DIAGNOSIS — G93.89 BRAIN MASS: ICD-10-CM

## 2022-12-06 DIAGNOSIS — G93.89 CEREBELLAR MASS: ICD-10-CM

## 2022-12-06 DIAGNOSIS — C79.31 BRAIN METASTASES: Primary | ICD-10-CM

## 2022-12-06 LAB
ABORH RETYPE: NORMAL
GLUCOSE SERPL-MCNC: 121 MG/DL (ref 70–110)
GROUP & RH: NORMAL
HCO3 UR-SCNC: 20.8 MMOL/L (ref 24–28)
HCT VFR BLD CALC: 27 %PCV (ref 36–54)
HGB BLD-MCNC: 9 G/DL
INDIRECT COOMBS GEL: NORMAL
INR BLD: 0.91 (ref 0–1.3)
PCO2 BLDA: 29.7 MMHG (ref 35–45)
PH SMN: 7.45 [PH] (ref 7.35–7.45)
PO2 BLDA: 252 MMHG (ref 80–100)
POC BE: -3 MMOL/L
POC IONIZED CALCIUM: 0.98 MMOL/L (ref 1.06–1.42)
POC SATURATED O2: 100 % (ref 95–100)
POC TCO2: 22 MMOL/L (ref 23–27)
POTASSIUM BLD-SCNC: 3.2 MMOL/L (ref 3.5–5.1)
PROTHROMBIN TIME: 12.2 SECONDS (ref 12.5–14.5)
SAMPLE: ABNORMAL
SODIUM BLD-SCNC: 139 MMOL/L (ref 136–145)

## 2022-12-06 PROCEDURE — 27201423 OPTIME MED/SURG SUP & DEVICES STERILE SUPPLY: Performed by: NEUROLOGICAL SURGERY

## 2022-12-06 PROCEDURE — 61510 CRNEC TREPH EXC BRN TUM STTL: CPT | Mod: ,,, | Performed by: NEUROLOGICAL SURGERY

## 2022-12-06 PROCEDURE — 88307 TISSUE EXAM BY PATHOLOGIST: CPT | Performed by: NEUROLOGICAL SURGERY

## 2022-12-06 PROCEDURE — C1713 ANCHOR/SCREW BN/BN,TIS/BN: HCPCS | Performed by: NEUROLOGICAL SURGERY

## 2022-12-06 PROCEDURE — 61510 PR EXCIS SUPRATENT BRAIN TUMOR: ICD-10-PCS | Mod: AS,,, | Performed by: NURSE PRACTITIONER

## 2022-12-06 PROCEDURE — 37000009 HC ANESTHESIA EA ADD 15 MINS: Performed by: NEUROLOGICAL SURGERY

## 2022-12-06 PROCEDURE — 25000003 PHARM REV CODE 250: Performed by: NURSE PRACTITIONER

## 2022-12-06 PROCEDURE — 61781 SCAN PROC CRANIAL INTRA: CPT | Mod: ,,, | Performed by: NEUROLOGICAL SURGERY

## 2022-12-06 PROCEDURE — 36000713 HC OR TIME LEV V EA ADD 15 MIN: Performed by: NEUROLOGICAL SURGERY

## 2022-12-06 PROCEDURE — 63600175 PHARM REV CODE 636 W HCPCS: Performed by: NEUROLOGICAL SURGERY

## 2022-12-06 PROCEDURE — 36000712 HC OR TIME LEV V 1ST 15 MIN: Performed by: NEUROLOGICAL SURGERY

## 2022-12-06 PROCEDURE — 25000003 PHARM REV CODE 250: Performed by: NEUROLOGICAL SURGERY

## 2022-12-06 PROCEDURE — 61510 CRNEC TREPH EXC BRN TUM STTL: CPT | Mod: AS,,, | Performed by: NURSE PRACTITIONER

## 2022-12-06 PROCEDURE — 20000000 HC ICU ROOM

## 2022-12-06 PROCEDURE — 86923 COMPATIBILITY TEST ELECTRIC: CPT | Performed by: NEUROLOGICAL SURGERY

## 2022-12-06 PROCEDURE — 63600175 PHARM REV CODE 636 W HCPCS

## 2022-12-06 PROCEDURE — 63600175 PHARM REV CODE 636 W HCPCS: Performed by: ANESTHESIOLOGY

## 2022-12-06 PROCEDURE — 63600175 PHARM REV CODE 636 W HCPCS: Performed by: NURSE PRACTITIONER

## 2022-12-06 PROCEDURE — 61510 PR EXCIS SUPRATENT BRAIN TUMOR: ICD-10-PCS | Mod: ,,, | Performed by: NEUROLOGICAL SURGERY

## 2022-12-06 PROCEDURE — C1762 CONN TISS, HUMAN(INC FASCIA): HCPCS | Performed by: NEUROLOGICAL SURGERY

## 2022-12-06 PROCEDURE — 36620 INSERTION CATHETER ARTERY: CPT

## 2022-12-06 PROCEDURE — 69990 MICROSURGERY ADD-ON: CPT | Mod: 59,,, | Performed by: NEUROLOGICAL SURGERY

## 2022-12-06 PROCEDURE — 69990 PR MICROSURG TECHNIQUES,REQ OPER MICROSCOPE: ICD-10-PCS | Mod: 59,,, | Performed by: NEUROLOGICAL SURGERY

## 2022-12-06 PROCEDURE — 25000003 PHARM REV CODE 250

## 2022-12-06 PROCEDURE — 36415 COLL VENOUS BLD VENIPUNCTURE: CPT | Performed by: NEUROLOGICAL SURGERY

## 2022-12-06 PROCEDURE — C1729 CATH, DRAINAGE: HCPCS | Performed by: NEUROLOGICAL SURGERY

## 2022-12-06 PROCEDURE — 71000033 HC RECOVERY, INTIAL HOUR: Performed by: NEUROLOGICAL SURGERY

## 2022-12-06 PROCEDURE — 85610 PROTHROMBIN TIME: CPT | Performed by: NEUROLOGICAL SURGERY

## 2022-12-06 PROCEDURE — 37000008 HC ANESTHESIA 1ST 15 MINUTES: Performed by: NEUROLOGICAL SURGERY

## 2022-12-06 PROCEDURE — 86850 RBC ANTIBODY SCREEN: CPT | Performed by: NEUROLOGICAL SURGERY

## 2022-12-06 PROCEDURE — 25000003 PHARM REV CODE 250: Performed by: ANESTHESIOLOGY

## 2022-12-06 PROCEDURE — 71000039 HC RECOVERY, EACH ADD'L HOUR: Performed by: NEUROLOGICAL SURGERY

## 2022-12-06 PROCEDURE — 88331 PATH CONSLTJ SURG 1 BLK 1SPC: CPT | Performed by: NEUROLOGICAL SURGERY

## 2022-12-06 PROCEDURE — 61781 PR STEREOTACTIC COMP ASSIST PROC,CRANIAL,INTRADURAL: ICD-10-PCS | Mod: ,,, | Performed by: NEUROLOGICAL SURGERY

## 2022-12-06 DEVICE — IMPLANTABLE DEVICE: Type: IMPLANTABLE DEVICE | Site: PARIETAL | Status: FUNCTIONAL

## 2022-12-06 DEVICE — DURA MATRIX ONLAY PLUS 2X2: Type: IMPLANTABLE DEVICE | Site: PARIETAL | Status: FUNCTIONAL

## 2022-12-06 RX ORDER — ONDANSETRON 2 MG/ML
INJECTION INTRAMUSCULAR; INTRAVENOUS
Status: COMPLETED
Start: 2022-12-06 | End: 2022-12-06

## 2022-12-06 RX ORDER — GABAPENTIN 100 MG/1
100 CAPSULE ORAL 2 TIMES DAILY
Status: DISCONTINUED | OUTPATIENT
Start: 2022-12-06 | End: 2022-12-07 | Stop reason: HOSPADM

## 2022-12-06 RX ORDER — ROCURONIUM BROMIDE 10 MG/ML
INJECTION, SOLUTION INTRAVENOUS
Status: DISCONTINUED | OUTPATIENT
Start: 2022-12-06 | End: 2022-12-06

## 2022-12-06 RX ORDER — MORPHINE SULFATE 10 MG/ML
2 INJECTION INTRAMUSCULAR; INTRAVENOUS; SUBCUTANEOUS EVERY 4 HOURS PRN
Status: DISCONTINUED | OUTPATIENT
Start: 2022-12-06 | End: 2022-12-07 | Stop reason: HOSPADM

## 2022-12-06 RX ORDER — BUPIVACAINE HYDROCHLORIDE AND EPINEPHRINE 5; 5 MG/ML; UG/ML
INJECTION, SOLUTION EPIDURAL; INTRACAUDAL; PERINEURAL
Status: DISCONTINUED | OUTPATIENT
Start: 2022-12-06 | End: 2022-12-06 | Stop reason: HOSPADM

## 2022-12-06 RX ORDER — LIDOCAINE HYDROCHLORIDE 20 MG/ML
INJECTION, SOLUTION EPIDURAL; INFILTRATION; INTRACAUDAL; PERINEURAL
Status: DISCONTINUED | OUTPATIENT
Start: 2022-12-06 | End: 2022-12-06

## 2022-12-06 RX ORDER — HYDROMORPHONE HYDROCHLORIDE 2 MG/ML
0.2 INJECTION, SOLUTION INTRAMUSCULAR; INTRAVENOUS; SUBCUTANEOUS EVERY 5 MIN PRN
Status: DISCONTINUED | OUTPATIENT
Start: 2022-12-06 | End: 2022-12-06

## 2022-12-06 RX ORDER — LABETALOL HYDROCHLORIDE 5 MG/ML
10 INJECTION, SOLUTION INTRAVENOUS
Status: DISCONTINUED | OUTPATIENT
Start: 2022-12-06 | End: 2022-12-07 | Stop reason: HOSPADM

## 2022-12-06 RX ORDER — DEXAMETHASONE 4 MG/1
4 TABLET ORAL EVERY 6 HOURS
Status: DISCONTINUED | OUTPATIENT
Start: 2022-12-06 | End: 2022-12-07 | Stop reason: HOSPADM

## 2022-12-06 RX ORDER — CEFAZOLIN SODIUM 1 G/3ML
INJECTION, POWDER, FOR SOLUTION INTRAMUSCULAR; INTRAVENOUS
Status: DISPENSED
Start: 2022-12-06 | End: 2022-12-07

## 2022-12-06 RX ORDER — ACETAMINOPHEN 325 MG/1
650 TABLET ORAL EVERY 4 HOURS PRN
Status: DISCONTINUED | OUTPATIENT
Start: 2022-12-06 | End: 2022-12-07 | Stop reason: HOSPADM

## 2022-12-06 RX ORDER — PROMETHAZINE HYDROCHLORIDE 25 MG/ML
INJECTION, SOLUTION INTRAMUSCULAR; INTRAVENOUS
Status: COMPLETED
Start: 2022-12-06 | End: 2022-12-06

## 2022-12-06 RX ORDER — PROMETHAZINE HYDROCHLORIDE 25 MG/1
25 TABLET ORAL EVERY 6 HOURS PRN
Status: DISCONTINUED | OUTPATIENT
Start: 2022-12-06 | End: 2022-12-07 | Stop reason: HOSPADM

## 2022-12-06 RX ORDER — ONDANSETRON HYDROCHLORIDE 2 MG/ML
INJECTION, SOLUTION INTRAMUSCULAR; INTRAVENOUS
Status: DISCONTINUED | OUTPATIENT
Start: 2022-12-06 | End: 2022-12-06

## 2022-12-06 RX ORDER — OXYCODONE AND ACETAMINOPHEN 10; 325 MG/1; MG/1
1 TABLET ORAL EVERY 4 HOURS PRN
Status: DISCONTINUED | OUTPATIENT
Start: 2022-12-06 | End: 2022-12-07

## 2022-12-06 RX ORDER — ONDANSETRON 2 MG/ML
4 INJECTION INTRAMUSCULAR; INTRAVENOUS EVERY 4 HOURS PRN
Status: DISCONTINUED | OUTPATIENT
Start: 2022-12-06 | End: 2022-12-07 | Stop reason: HOSPADM

## 2022-12-06 RX ORDER — ONDANSETRON 2 MG/ML
4 INJECTION INTRAMUSCULAR; INTRAVENOUS DAILY PRN
Status: DISCONTINUED | OUTPATIENT
Start: 2022-12-06 | End: 2022-12-06

## 2022-12-06 RX ORDER — BACITRACIN ZINC 500 [USP'U]/G
OINTMENT TOPICAL
Status: DISCONTINUED | OUTPATIENT
Start: 2022-12-06 | End: 2022-12-06 | Stop reason: HOSPADM

## 2022-12-06 RX ORDER — LIDOCAINE HYDROCHLORIDE 20 MG/ML
INJECTION, SOLUTION EPIDURAL; INFILTRATION; INTRACAUDAL; PERINEURAL
Status: DISPENSED
Start: 2022-12-06 | End: 2022-12-06

## 2022-12-06 RX ORDER — CETIRIZINE HYDROCHLORIDE 10 MG/1
10 TABLET ORAL DAILY
Status: DISCONTINUED | OUTPATIENT
Start: 2022-12-06 | End: 2022-12-07 | Stop reason: HOSPADM

## 2022-12-06 RX ORDER — LEVETIRACETAM 500 MG/5ML
INJECTION, SOLUTION, CONCENTRATE INTRAVENOUS
Status: DISCONTINUED | OUTPATIENT
Start: 2022-12-06 | End: 2022-12-06

## 2022-12-06 RX ORDER — MIDAZOLAM HYDROCHLORIDE 1 MG/ML
INJECTION INTRAMUSCULAR; INTRAVENOUS
Status: DISCONTINUED | OUTPATIENT
Start: 2022-12-06 | End: 2022-12-06

## 2022-12-06 RX ORDER — LIDOCAINE HYDROCHLORIDE 10 MG/ML
1 INJECTION, SOLUTION EPIDURAL; INFILTRATION; INTRACAUDAL; PERINEURAL ONCE
Status: CANCELLED | OUTPATIENT
Start: 2022-12-06 | End: 2022-12-06

## 2022-12-06 RX ORDER — HYDRALAZINE HYDROCHLORIDE 20 MG/ML
10 INJECTION INTRAMUSCULAR; INTRAVENOUS
Status: DISCONTINUED | OUTPATIENT
Start: 2022-12-06 | End: 2022-12-07 | Stop reason: HOSPADM

## 2022-12-06 RX ORDER — LISINOPRIL 20 MG/1
20 TABLET ORAL DAILY
Status: DISCONTINUED | OUTPATIENT
Start: 2022-12-06 | End: 2022-12-07

## 2022-12-06 RX ORDER — METOPROLOL SUCCINATE 25 MG/1
25 TABLET, EXTENDED RELEASE ORAL DAILY
Status: DISCONTINUED | OUTPATIENT
Start: 2022-12-06 | End: 2022-12-07 | Stop reason: HOSPADM

## 2022-12-06 RX ORDER — OXYCODONE AND ACETAMINOPHEN 5; 325 MG/1; MG/1
1 TABLET ORAL EVERY 4 HOURS PRN
Status: DISCONTINUED | OUTPATIENT
Start: 2022-12-06 | End: 2022-12-07

## 2022-12-06 RX ORDER — LEVETIRACETAM 500 MG/1
500 TABLET ORAL EVERY 12 HOURS
Status: DISCONTINUED | OUTPATIENT
Start: 2022-12-06 | End: 2022-12-07 | Stop reason: HOSPADM

## 2022-12-06 RX ORDER — MIDAZOLAM HYDROCHLORIDE 1 MG/ML
2 INJECTION INTRAMUSCULAR; INTRAVENOUS ONCE AS NEEDED
Status: CANCELLED | OUTPATIENT
Start: 2022-12-06 | End: 2034-05-03

## 2022-12-06 RX ORDER — DEXAMETHASONE SODIUM PHOSPHATE 4 MG/ML
INJECTION, SOLUTION INTRA-ARTICULAR; INTRALESIONAL; INTRAMUSCULAR; INTRAVENOUS; SOFT TISSUE
Status: DISCONTINUED | OUTPATIENT
Start: 2022-12-06 | End: 2022-12-06

## 2022-12-06 RX ORDER — HYDROMORPHONE HYDROCHLORIDE 2 MG/ML
INJECTION, SOLUTION INTRAMUSCULAR; INTRAVENOUS; SUBCUTANEOUS
Status: DISCONTINUED | OUTPATIENT
Start: 2022-12-06 | End: 2022-12-06

## 2022-12-06 RX ORDER — FENTANYL CITRATE 50 UG/ML
INJECTION, SOLUTION INTRAMUSCULAR; INTRAVENOUS
Status: DISCONTINUED | OUTPATIENT
Start: 2022-12-06 | End: 2022-12-06

## 2022-12-06 RX ORDER — SCOLOPAMINE TRANSDERMAL SYSTEM 1 MG/1
1 PATCH, EXTENDED RELEASE TRANSDERMAL
Status: DISCONTINUED | OUTPATIENT
Start: 2022-12-06 | End: 2022-12-07 | Stop reason: HOSPADM

## 2022-12-06 RX ORDER — DEXTROSE MONOHYDRATE 5 G/100ML
INJECTION INTRAVENOUS
Status: DISPENSED
Start: 2022-12-06 | End: 2022-12-07

## 2022-12-06 RX ORDER — CEFAZOLIN SODIUM 2 G/50ML
2 SOLUTION INTRAVENOUS
Status: COMPLETED | OUTPATIENT
Start: 2022-12-06 | End: 2022-12-06

## 2022-12-06 RX ORDER — ALPRAZOLAM 0.25 MG/1
0.25 TABLET ORAL NIGHTLY PRN
Status: DISCONTINUED | OUTPATIENT
Start: 2022-12-06 | End: 2022-12-07 | Stop reason: HOSPADM

## 2022-12-06 RX ORDER — ACETAMINOPHEN 10 MG/ML
INJECTION, SOLUTION INTRAVENOUS
Status: DISCONTINUED | OUTPATIENT
Start: 2022-12-06 | End: 2022-12-06

## 2022-12-06 RX ORDER — MUPIROCIN 20 MG/G
OINTMENT TOPICAL 2 TIMES DAILY
Status: DISCONTINUED | OUTPATIENT
Start: 2022-12-06 | End: 2022-12-07 | Stop reason: HOSPADM

## 2022-12-06 RX ORDER — CEFAZOLIN SODIUM 1 G/3ML
INJECTION, POWDER, FOR SOLUTION INTRAMUSCULAR; INTRAVENOUS
Status: DISCONTINUED | OUTPATIENT
Start: 2022-12-06 | End: 2022-12-06 | Stop reason: HOSPADM

## 2022-12-06 RX ORDER — HYDROCODONE BITARTRATE AND ACETAMINOPHEN 500; 5 MG/1; MG/1
TABLET ORAL
Status: DISCONTINUED | OUTPATIENT
Start: 2022-12-06 | End: 2022-12-07 | Stop reason: HOSPADM

## 2022-12-06 RX ORDER — ACETAMINOPHEN 10 MG/ML
1000 INJECTION, SOLUTION INTRAVENOUS ONCE
Status: DISCONTINUED | OUTPATIENT
Start: 2022-12-06 | End: 2022-12-06

## 2022-12-06 RX ORDER — NYSTATIN 100000 [USP'U]/ML
500000 SUSPENSION ORAL 4 TIMES DAILY
Status: DISCONTINUED | OUTPATIENT
Start: 2022-12-06 | End: 2022-12-07 | Stop reason: HOSPADM

## 2022-12-06 RX ORDER — HEPARIN 100 UNIT/ML
SYRINGE INTRAVENOUS
Status: DISPENSED
Start: 2022-12-06 | End: 2022-12-06

## 2022-12-06 RX ORDER — SODIUM CHLORIDE, SODIUM GLUCONATE, SODIUM ACETATE, POTASSIUM CHLORIDE AND MAGNESIUM CHLORIDE 30; 37; 368; 526; 502 MG/100ML; MG/100ML; MG/100ML; MG/100ML; MG/100ML
INJECTION, SOLUTION INTRAVENOUS CONTINUOUS
Status: CANCELLED | OUTPATIENT
Start: 2022-12-06 | End: 2023-01-05

## 2022-12-06 RX ORDER — SODIUM CHLORIDE 9 MG/ML
INJECTION, SOLUTION INTRAVENOUS CONTINUOUS
Status: DISCONTINUED | OUTPATIENT
Start: 2022-12-06 | End: 2022-12-07 | Stop reason: HOSPADM

## 2022-12-06 RX ORDER — MUPIROCIN 20 MG/G
1 OINTMENT TOPICAL 2 TIMES DAILY
Status: DISCONTINUED | OUTPATIENT
Start: 2022-12-06 | End: 2022-12-06

## 2022-12-06 RX ORDER — DIPHENHYDRAMINE HYDROCHLORIDE 50 MG/ML
25 INJECTION INTRAMUSCULAR; INTRAVENOUS EVERY 6 HOURS PRN
Status: DISCONTINUED | OUTPATIENT
Start: 2022-12-06 | End: 2022-12-06

## 2022-12-06 RX ORDER — PROPOFOL 10 MG/ML
VIAL (ML) INTRAVENOUS
Status: DISCONTINUED | OUTPATIENT
Start: 2022-12-06 | End: 2022-12-06

## 2022-12-06 RX ORDER — SCOLOPAMINE TRANSDERMAL SYSTEM 1 MG/1
PATCH, EXTENDED RELEASE TRANSDERMAL
Status: COMPLETED
Start: 2022-12-06 | End: 2022-12-06

## 2022-12-06 RX ORDER — PHENYLEPHRINE HCL IN 0.9% NACL 1 MG/10 ML
SYRINGE (ML) INTRAVENOUS
Status: DISCONTINUED | OUTPATIENT
Start: 2022-12-06 | End: 2022-12-06

## 2022-12-06 RX ADMIN — DEXAMETHASONE 4 MG: 4 TABLET ORAL at 08:12

## 2022-12-06 RX ADMIN — ONDANSETRON 4 MG: 2 INJECTION INTRAMUSCULAR; INTRAVENOUS at 06:12

## 2022-12-06 RX ADMIN — ROCURONIUM BROMIDE 50 MG: 50 INJECTION INTRAVENOUS at 07:12

## 2022-12-06 RX ADMIN — PROPOFOL 150 MG: 10 INJECTION, EMULSION INTRAVENOUS at 07:12

## 2022-12-06 RX ADMIN — ROCURONIUM BROMIDE 20 MG: 50 INJECTION INTRAVENOUS at 08:12

## 2022-12-06 RX ADMIN — PHENYLEPHRINE HYDROCHLORIDE 20 MCG/MIN: 10 INJECTION INTRAVENOUS at 08:12

## 2022-12-06 RX ADMIN — HYDROMORPHONE HYDROCHLORIDE 0.2 MG: 2 INJECTION INTRAMUSCULAR; INTRAVENOUS; SUBCUTANEOUS at 01:12

## 2022-12-06 RX ADMIN — SODIUM CHLORIDE: 9 INJECTION, SOLUTION INTRAVENOUS at 05:12

## 2022-12-06 RX ADMIN — CEFAZOLIN SODIUM 2 G: 2 SOLUTION INTRAVENOUS at 07:12

## 2022-12-06 RX ADMIN — PROPOFOL 50 MG: 10 INJECTION, EMULSION INTRAVENOUS at 07:12

## 2022-12-06 RX ADMIN — CEFAZOLIN SODIUM 2 G: 2 SOLUTION INTRAVENOUS at 11:12

## 2022-12-06 RX ADMIN — DEXTROSE MONOHYDRATE 1 G: 2.5 INJECTION INTRAVENOUS at 06:12

## 2022-12-06 RX ADMIN — HYDROMORPHONE HYDROCHLORIDE 0.5 MG: 2 INJECTION, SOLUTION INTRAMUSCULAR; INTRAVENOUS; SUBCUTANEOUS at 12:12

## 2022-12-06 RX ADMIN — MIDAZOLAM HYDROCHLORIDE 2 MG: 1 INJECTION, SOLUTION INTRAMUSCULAR; INTRAVENOUS at 07:12

## 2022-12-06 RX ADMIN — ACETAMINOPHEN 1000 MG: 10 INJECTION, SOLUTION INTRAVENOUS at 11:12

## 2022-12-06 RX ADMIN — LEVETIRACETAM 500 MG: 100 INJECTION, SOLUTION INTRAVENOUS at 07:12

## 2022-12-06 RX ADMIN — ONDANSETRON 4 MG: 2 INJECTION INTRAMUSCULAR; INTRAVENOUS at 11:12

## 2022-12-06 RX ADMIN — MUPIROCIN: 20 OINTMENT TOPICAL at 08:12

## 2022-12-06 RX ADMIN — GABAPENTIN 100 MG: 100 CAPSULE ORAL at 08:12

## 2022-12-06 RX ADMIN — PROMETHAZINE HYDROCHLORIDE 12.5 MG: 25 INJECTION INTRAMUSCULAR; INTRAVENOUS at 02:12

## 2022-12-06 RX ADMIN — ALPRAZOLAM 0.25 MG: 0.25 TABLET ORAL at 08:12

## 2022-12-06 RX ADMIN — ROCURONIUM BROMIDE 20 MG: 50 INJECTION INTRAVENOUS at 10:12

## 2022-12-06 RX ADMIN — HYDRALAZINE HYDROCHLORIDE 10 MG: 20 INJECTION INTRAMUSCULAR; INTRAVENOUS at 01:12

## 2022-12-06 RX ADMIN — Medication 100 MCG: at 08:12

## 2022-12-06 RX ADMIN — ONDANSETRON 4 MG: 2 INJECTION INTRAMUSCULAR; INTRAVENOUS at 01:12

## 2022-12-06 RX ADMIN — FENTANYL CITRATE 100 MCG: 50 INJECTION, SOLUTION INTRAMUSCULAR; INTRAVENOUS at 07:12

## 2022-12-06 RX ADMIN — DEXAMETHASONE SODIUM PHOSPHATE 4 MG: 4 INJECTION, SOLUTION INTRA-ARTICULAR; INTRALESIONAL; INTRAMUSCULAR; INTRAVENOUS; SOFT TISSUE at 07:12

## 2022-12-06 RX ADMIN — PROMETHAZINE HYDROCHLORIDE 25 MG: 25 TABLET ORAL at 08:12

## 2022-12-06 RX ADMIN — SUGAMMADEX 200 MG: 100 INJECTION, SOLUTION INTRAVENOUS at 12:12

## 2022-12-06 RX ADMIN — LEVETIRACETAM 500 MG: 500 TABLET, FILM COATED ORAL at 08:12

## 2022-12-06 RX ADMIN — NYSTATIN 500000 UNITS: 100000 SUSPENSION ORAL at 08:12

## 2022-12-06 RX ADMIN — SCOPOLAMINE 1 PATCH: 1 PATCH TRANSDERMAL at 06:12

## 2022-12-06 RX ADMIN — SODIUM CHLORIDE, SODIUM GLUCONATE, SODIUM ACETATE, POTASSIUM CHLORIDE AND MAGNESIUM CHLORIDE: 526; 502; 368; 37; 30 INJECTION, SOLUTION INTRAVENOUS at 07:12

## 2022-12-06 RX ADMIN — ROCURONIUM BROMIDE 20 MG: 50 INJECTION INTRAVENOUS at 09:12

## 2022-12-06 RX ADMIN — LIDOCAINE HYDROCHLORIDE 50 MG: 20 INJECTION, SOLUTION EPIDURAL; INFILTRATION; INTRACAUDAL; PERINEURAL at 07:12

## 2022-12-06 NOTE — ANESTHESIA PROCEDURE NOTES
Intubation    Date/Time: 12/6/2022 7:31 AM  Performed by: Long Nails  Authorized by: Td De Souza MD     Intubation:     Induction:  Intravenous    Intubated:  Postinduction    Mask Ventilation:  Easy mask    Attempts:  1    Attempted By:  Student    Method of Intubation:  Direct    Blade:  Julius 3    Laryngeal View Grade: Grade IIA - cords partially seen      Difficult Airway Encountered?: No      Complications:  None    Airway Device:  Oral endotracheal tube    Airway Device Size:  7.5    Style/Cuff Inflation:  Cuffed (inflated to minimal occlusive pressure)    Tube secured:  21    Secured at:  The lips    Placement Verified By:  Capnometry    Complicating Factors:  None    Findings Post-Intubation:  BS equal bilateral and atraumatic/condition of teeth unchanged

## 2022-12-06 NOTE — ANESTHESIA PROCEDURE NOTES
Arterial    Diagnosis: brain mass    Patient location during procedure: holding area  Procedure start time: 12/6/2022 7:00 AM  Timeout: 12/6/2022 7:00 AM  Procedure end time: 12/6/2022 7:07 AM    Staffing  Authorizing Provider: Td De Souza MD  Performing Provider: [employer]    Anesthesiologist was present at the time of the procedure.    Preanesthetic Checklist  Completed: patient identified, IV checked, site marked, risks and benefits discussed, surgical consent, monitors and equipment checked, pre-op evaluation, timeout performed and anesthesia consent givenArterial  Skin Prep: chlorhexidine gluconate  Local Infiltration: lidocaine  Orientation: left  Location: radial    Catheter Size: 20 G Insertion Attempts: 1  Assessment  Dressing: secured with tape and tegaderm  Patient: Tolerated well

## 2022-12-06 NOTE — ANESTHESIA PREPROCEDURE EVALUATION
"                                                                                                             12/06/2022  Kaykay Crespo is a 67 y.o., female with metastatic brain mass for craniotomy and computer assisted excision.  She is calm and conversant in holding area, denies cardiopulmonary complaints.  She is capable of greater than 4 mets.    "HPI:  The patient presents today for pre-operative appointment.  Patient is a 67-year-old female with a history of breast cancer was diagnosed in 1999.  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  She reports multiple compression fractures in the back about a year later.  Recently, she developed pain down the left arm to the 4th and 5th digits of the left hand.  She is intermittent shocking pain in the left forearm and hand.  She denies any weakness.  She does not drop things.  Her pain increases when looking up.  She discussed her symptoms with her oncologist Dr. Euceda.  MRI of the cervical spine was obtained revealing mass in the right occipital region.  MRI brain was then performed confirming a lesion in the right occipital temporal region with surrounding edema.  She was started on steroids and gabapentin.  She had improvement in her left upper extremity symptoms after starting the medicines.  She was referred to Dr. Gomez for neurosurgical evaluation. She was seen in the office on 11/14/2022 and surgery was discussed. She had an appointment at [facility] recently. She has decided to proceed with surgery with Dr. Gomez. She presents today for her pre-operative appointment.      The patient continues with some left arm pain, which is unchanged. She denies any new symptoms since her appointment with Dr. Gomez a few weeks ago. She continues with some memory issues. She is present today with her . She is ready to proceed with surgery as planned.      She is on Coumadin. She was instructed to bridge with lovenox once " "daily by Dr. Euceda.      Review of patient's allergies indicates:  No Known Allergies  Current Medications          Current Outpatient Medications   Medication Sig Dispense Refill    ALPRAZolam (XANAX) 0.5 MG tablet TAKE ONE TABLET BY MOUTH AT BEDTIME (Patient taking differently: Take 0.25 mg by mouth nightly as needed.) 30 tablet 5    calcium-vitamin D3 (OS-KENA 500 + D3) 500 mg-5 mcg (200 unit) per tablet Take 1 tablet by mouth Daily.        dexAMETHasone (DECADRON) 4 MG Tab Take 1 mg by mouth every 8 (eight) hours.        gabapentin (NEURONTIN) 100 MG capsule Take 100 mg by mouth 2 (two) times daily.        lisinopriL (PRINIVIL,ZESTRIL) 20 MG tablet Take 20 mg by mouth Daily.        loratadine (CLARITIN REDITABS) 10 mg dissolvable tablet Take 10 mg by mouth Daily.        metoprolol succinate (TOPROL-XL) 25 MG 24 hr tablet Take 25 mg by mouth once daily.        nystatin (MYCOSTATIN) 100,000 unit/mL suspension Take 500,000 Units by mouth.        warfarin (COUMADIN) 4 MG tablet Take 4 mg by mouth Daily.        oxyCODONE (ROXICODONE) 5 MG immediate release tablet Take 5 mg by mouth.          No current facility-administered medications for this visit.              Patient Active Problem List     Diagnosis Date Noted    Brain mass 12/05/2022              Past Medical History:   Diagnosis Date    Anxiety disorder, unspecified      Cerebellar mass      Compression fracture of L1 lumbar vertebra      History of DVT (deep vein thrombosis)      HLD (hyperlipidemia)      HTN (hypertension)      Metastatic breast cancer      Osteoporosis      PONV (postoperative nausea and vomiting)"            Pre-op Assessment    I have reviewed the Patient Summary Reports.     I have reviewed the Nursing Notes. I have reviewed the NPO Status.   I have reviewed the Medications.     Review of Systems  Anesthesia Hx:  PONV Denies Family Hx of Anesthesia complications.   Denies Personal Hx of Anesthesia complications. "   Hematology/Oncology:        Current/Recent Cancer.   Cardiovascular:   Exercise tolerance: good Hypertension    Pulmonary:  Pulmonary Normal    Neurological:   cerebellar mass   Psych:   Psychiatric History          Physical Exam  General: Well nourished, Cooperative, Alert and Oriented    Airway:  Mallampati: II   Mouth Opening: Normal  TM Distance: Normal  Tongue: Normal  Neck ROM: Normal ROM    Dental:  Edentulous    Chest/Lungs:  Clear to auscultation, Normal Respiratory Rate    Heart:  Rate: Normal  Rhythm: Regular Rhythm        Anesthesia Plan  Type of Anesthesia, risks & benefits discussed:    Anesthesia Type: Gen ETT  Intra-op Monitoring Plan: Standard ASA Monitors and Art Line  Post Op Pain Control Plan: multimodal analgesia and IV/PO Opioids PRN  Induction:  IV  Airway Plan: Direct, Post-Induction  Informed Consent: Informed consent signed with the Patient and all parties understand the risks and agree with anesthesia plan.  All questions answered.   ASA Score: 3  Day of Surgery Review of History & Physical: H&P Update referred to the surgeon/provider.    Ready For Surgery From Anesthesia Perspective.     .

## 2022-12-06 NOTE — BRIEF OP NOTE
[facility] - Periop Services  Brief Operative Note    SUMMARY     Surgery Date: 12/6/2022     Surgeon(s) and Role:     * Theron Gomez MD - Primary    Assistant: Felisha Mckeon NP; Gabi Mercado NP    Pre-op Diagnosis:  Brain metastases [C79.31]    Post-op Diagnosis:  Post-Op Diagnosis Codes:     * Brain metastases [C79.31]    Procedure(s) (LRB):  CRANIOTOMY, WITH NEOPLASM EXCISION USING COMPUTER-ASSISTED NAVIGATION (Right)  Microscope  Stealth  Hankamer cranial fixation    Anesthesia: General    Operative Findings: Patient tolerated procedure well and was transferred to PACU.     Estimated Blood Loss: 200 mL  Estimated Blood Loss has been documented.  Estimated Blood Loss has been documented.         Specimens:   Specimen (24h ago, onward)       Start     Ordered    12/06/22 0941  Specimen to Pathology  RELEASE UPON ORDERING        References:    Click here for ordering Quick Tip   Question:  Release to patient  Answer:  Immediate    12/06/22 0990                    [id number]

## 2022-12-07 VITALS
BODY MASS INDEX: 25.33 KG/M2 | HEIGHT: 65 IN | TEMPERATURE: 97 F | RESPIRATION RATE: 17 BRPM | HEART RATE: 67 BPM | WEIGHT: 152 LBS | DIASTOLIC BLOOD PRESSURE: 84 MMHG | SYSTOLIC BLOOD PRESSURE: 140 MMHG | OXYGEN SATURATION: 97 %

## 2022-12-07 DIAGNOSIS — K21.9 GASTROESOPHAGEAL REFLUX DISEASE, UNSPECIFIED WHETHER ESOPHAGITIS PRESENT: Primary | ICD-10-CM

## 2022-12-07 LAB
ANION GAP SERPL CALC-SCNC: 8 MEQ/L
BASOPHILS # BLD AUTO: 0.03 X10(3)/MCL (ref 0–0.2)
BASOPHILS NFR BLD AUTO: 0.2 %
BUN SERPL-MCNC: 17.9 MG/DL (ref 9.8–20.1)
CALCIUM SERPL-MCNC: 7.4 MG/DL (ref 8.4–10.2)
CHLORIDE SERPL-SCNC: 107 MMOL/L (ref 98–107)
CO2 SERPL-SCNC: 24 MMOL/L (ref 23–31)
CREAT SERPL-MCNC: 0.67 MG/DL (ref 0.55–1.02)
CREAT/UREA NIT SERPL: 27
EOSINOPHIL # BLD AUTO: 0 X10(3)/MCL (ref 0–0.9)
EOSINOPHIL NFR BLD AUTO: 0 %
ERYTHROCYTE [DISTWIDTH] IN BLOOD BY AUTOMATED COUNT: 14.4 % (ref 11.5–17)
GFR SERPLBLD CREATININE-BSD FMLA CKD-EPI: >60 MLS/MIN/1.73/M2
GLUCOSE SERPL-MCNC: 156 MG/DL (ref 82–115)
HCT VFR BLD AUTO: 34.3 % (ref 37–47)
HGB BLD-MCNC: 11.3 GM/DL (ref 12–16)
IMM GRANULOCYTES # BLD AUTO: 0.16 X10(3)/MCL (ref 0–0.04)
IMM GRANULOCYTES NFR BLD AUTO: 1.1 %
LYMPHOCYTES # BLD AUTO: 1.03 X10(3)/MCL (ref 0.6–4.6)
LYMPHOCYTES NFR BLD AUTO: 7.3 %
MCH RBC QN AUTO: 32.7 PG (ref 27–31)
MCHC RBC AUTO-ENTMCNC: 32.9 MG/DL (ref 33–36)
MCV RBC AUTO: 99.1 FL (ref 80–94)
MONOCYTES # BLD AUTO: 1.37 X10(3)/MCL (ref 0.1–1.3)
MONOCYTES NFR BLD AUTO: 9.8 %
NEUTROPHILS # BLD AUTO: 11.4 X10(3)/MCL (ref 2.1–9.2)
NEUTROPHILS NFR BLD AUTO: 81.6 %
NRBC BLD AUTO-RTO: 0.1 %
PLATELET # BLD AUTO: 250 X10(3)/MCL (ref 130–400)
PMV BLD AUTO: 9.1 FL (ref 7.4–10.4)
POTASSIUM SERPL-SCNC: 4.3 MMOL/L (ref 3.5–5.1)
RBC # BLD AUTO: 3.46 X10(6)/MCL (ref 4.2–5.4)
SODIUM SERPL-SCNC: 139 MMOL/L (ref 136–145)
WBC # SPEC AUTO: 14 X10(3)/MCL (ref 4.5–11.5)

## 2022-12-07 PROCEDURE — 25000003 PHARM REV CODE 250: Performed by: NURSE PRACTITIONER

## 2022-12-07 PROCEDURE — 85025 COMPLETE CBC W/AUTO DIFF WBC: CPT | Performed by: NURSE PRACTITIONER

## 2022-12-07 PROCEDURE — 97162 PT EVAL MOD COMPLEX 30 MIN: CPT

## 2022-12-07 PROCEDURE — 25500020 PHARM REV CODE 255: Performed by: NEUROLOGICAL SURGERY

## 2022-12-07 PROCEDURE — 99024 POSTOP FOLLOW-UP VISIT: CPT | Mod: POP,,, | Performed by: NEUROLOGICAL SURGERY

## 2022-12-07 PROCEDURE — 63600175 PHARM REV CODE 636 W HCPCS: Performed by: NURSE PRACTITIONER

## 2022-12-07 PROCEDURE — 80048 BASIC METABOLIC PNL TOTAL CA: CPT | Performed by: NURSE PRACTITIONER

## 2022-12-07 PROCEDURE — 99024 POSTOP FOLLOW-UP VISIT: CPT | Mod: POP,,, | Performed by: NURSE PRACTITIONER

## 2022-12-07 PROCEDURE — A9577 INJ MULTIHANCE: HCPCS | Performed by: NEUROLOGICAL SURGERY

## 2022-12-07 PROCEDURE — 99024 PR POST-OP FOLLOW-UP VISIT: ICD-10-PCS | Mod: POP,,, | Performed by: NURSE PRACTITIONER

## 2022-12-07 PROCEDURE — 36415 COLL VENOUS BLD VENIPUNCTURE: CPT | Performed by: NURSE PRACTITIONER

## 2022-12-07 PROCEDURE — 99024 PR POST-OP FOLLOW-UP VISIT: ICD-10-PCS | Mod: POP,,, | Performed by: NEUROLOGICAL SURGERY

## 2022-12-07 RX ORDER — HYDROCODONE BITARTRATE AND ACETAMINOPHEN 5; 325 MG/1; MG/1
1 TABLET ORAL EVERY 6 HOURS PRN
Status: DISCONTINUED | OUTPATIENT
Start: 2022-12-07 | End: 2022-12-07 | Stop reason: HOSPADM

## 2022-12-07 RX ORDER — PANTOPRAZOLE SODIUM 40 MG/1
40 TABLET, DELAYED RELEASE ORAL DAILY
Qty: 30 TABLET | Refills: 11 | Status: SHIPPED | OUTPATIENT
Start: 2022-12-07 | End: 2023-06-14

## 2022-12-07 RX ORDER — HYDROCODONE BITARTRATE AND ACETAMINOPHEN 10; 325 MG/1; MG/1
1 TABLET ORAL EVERY 6 HOURS PRN
Status: DISCONTINUED | OUTPATIENT
Start: 2022-12-07 | End: 2022-12-07 | Stop reason: HOSPADM

## 2022-12-07 RX ORDER — LEVETIRACETAM 500 MG/1
500 TABLET ORAL EVERY 12 HOURS
Qty: 60 TABLET | Refills: 11 | Status: SHIPPED | OUTPATIENT
Start: 2022-12-07 | End: 2023-02-01 | Stop reason: SDUPTHER

## 2022-12-07 RX ORDER — SUCRALFATE 1 G/10ML
1 SUSPENSION ORAL
Qty: 414 ML | Refills: 3 | Status: SHIPPED | OUTPATIENT
Start: 2022-12-07 | End: 2023-06-30

## 2022-12-07 RX ORDER — HYDROCODONE BITARTRATE AND ACETAMINOPHEN 7.5; 325 MG/1; MG/1
1 TABLET ORAL EVERY 4 HOURS PRN
Qty: 42 TABLET | Refills: 0 | Status: SHIPPED | OUTPATIENT
Start: 2022-12-07 | End: 2023-06-30

## 2022-12-07 RX ORDER — LISINOPRIL 20 MG/1
20 TABLET ORAL DAILY
Status: DISCONTINUED | OUTPATIENT
Start: 2022-12-07 | End: 2022-12-07 | Stop reason: HOSPADM

## 2022-12-07 RX ORDER — DEXAMETHASONE 2 MG/1
TABLET ORAL
Qty: 40 TABLET | Refills: 0 | Status: SHIPPED | OUTPATIENT
Start: 2022-12-07 | End: 2022-12-19

## 2022-12-07 RX ADMIN — LISINOPRIL 20 MG: 20 TABLET ORAL at 10:12

## 2022-12-07 RX ADMIN — GADOBENATE DIMEGLUMINE 15 ML: 529 INJECTION, SOLUTION INTRAVENOUS at 06:12

## 2022-12-07 RX ADMIN — NYSTATIN 500000 UNITS: 100000 SUSPENSION ORAL at 09:12

## 2022-12-07 RX ADMIN — DEXAMETHASONE 4 MG: 4 TABLET ORAL at 12:12

## 2022-12-07 RX ADMIN — ACETAMINOPHEN 650 MG: 325 TABLET, FILM COATED ORAL at 10:12

## 2022-12-07 RX ADMIN — LEVETIRACETAM 500 MG: 500 TABLET, FILM COATED ORAL at 09:12

## 2022-12-07 RX ADMIN — GABAPENTIN 100 MG: 100 CAPSULE ORAL at 09:12

## 2022-12-07 RX ADMIN — MUPIROCIN: 20 OINTMENT TOPICAL at 09:12

## 2022-12-07 RX ADMIN — DEXTROSE MONOHYDRATE 1 G: 2.5 INJECTION INTRAVENOUS at 12:12

## 2022-12-07 RX ADMIN — DEXTROSE MONOHYDRATE 1 G: 2.5 INJECTION INTRAVENOUS at 04:12

## 2022-12-07 RX ADMIN — METOPROLOL SUCCINATE 25 MG: 25 TABLET, EXTENDED RELEASE ORAL at 09:12

## 2022-12-07 RX ADMIN — CETIRIZINE HYDROCHLORIDE 10 MG: 10 TABLET, FILM COATED ORAL at 09:12

## 2022-12-07 RX ADMIN — ALPRAZOLAM 0.25 MG: 0.25 TABLET ORAL at 10:12

## 2022-12-07 RX ADMIN — DEXAMETHASONE 4 MG: 4 TABLET ORAL at 05:12

## 2022-12-07 NOTE — PLAN OF CARE
DCP assessment completed with patient as below. Patient lives with her spouse in a 2-story home with 1 entry step. Patent does not uses the interior steps. Patient is IADLs, drives and uses no AD at baseline. Patient will have the support of her spouse in the home at discharge.     CM consulted noted for HH PT and RW. FOC list provided for review for HH- patient will discuss with her friend and provide her choice shortly. Amenable to RW being ordered from [facility], patient states her  can stop to pick it up on the way home.       12/07/22 1308   Discharge Assessment   Assessment Type Discharge Planning Assessment   Confirmed/corrected address, phone number and insurance Yes   Confirmed Demographics Correct on Facesheet   Source of Information patient   When was your last doctors appointment?   (1 month ago)   Communicated EUNICE with patient/caregiver Yes   People in Home spouse   Do you expect to return to your current living situation? Yes   Do you have help at home or someone to help you manage your care at home? Yes   Who are your caregiver(s) and their phone number(s)? spouse Gordon; [phone]   Prior to hospitilization cognitive status: Alert/Oriented   Current cognitive status: Alert/Oriented   Home Accessibility stairs to enter home;stairs within home   Number of Stairs, Within Home, Primary other (see comments)  (16)   Stair Railings, Within Home, Primary railings safe and in good condition   Number of Stairs, Main Entrance one   Stair Railings, Main Entrance railings safe and in good condition   Home Layout Able to live on 1st floor   Readmission within 30 days? No   Patient currently being followed by outpatient case management? No   Do you currently have service(s) that help you manage your care at home? No   Do you have any problems affording any of your prescribed medications? No   Who is going to help you get home at discharge? spouse   How do you get to doctors appointments? car, drives  self;family or friend will provide   Discharge Plan A Home with family   Discharge Plan B Home Health   DME Needed Upon Discharge  walker, rolling   Discharge Plan discussed with: Patient     Cliff Reeder, RN Case Manager

## 2022-12-07 NOTE — NURSING
Nurses Note -- 4 Eyes      12/6/2022   06:00 PM      Skin assessed during: Admit      [x] No Pressure Injuries Present    [x]Prevention Measures Documented      [] Yes- Altered Skin Integrity Present or Discovered   [] LDA Added if Not in Epic (Describe Wound)   [] New Altered Skin Integrity was Present on Admit and Documented in LDA   [] Wound Image Taken    Wound Care Consulted? No    Attending Nurse:  Butch Wesley RN     Second RN/Staff Member:  Cathy Ryan RN

## 2022-12-07 NOTE — DISCHARGE SUMMARY
[facility] - 7th Floor ICU  Neurosurgery  Discharge Summary      Patient Name: Kaykay Crespo  MRN: [id number]  Admission Date: 12/6/2022  Hospital Length of Stay: 1 days  Discharge Date and Time:  12/07/2022 1:01 PM  Attending Physician: Theron Gomez MD   Discharging Provider: Felisha Mckeon [facility]  Primary Care Provider: Chi Godwin MD    HPI:   The patient presents today for pre-operative appointment.  Patient is a 67-year-old female with a history of breast cancer was diagnosed in 1999.  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  She reports multiple compression fractures in the back about a year later.  Recently, she developed pain down the left arm to the 4th and 5th digits of the left hand.  She is intermittent shocking pain in the left forearm and hand.  She denies any weakness.  She does not drop things.  Her pain increases when looking up.  She discussed her symptoms with her oncologist Dr. Euceda.  MRI of the cervical spine was obtained revealing mass in the right occipital region.  MRI brain was then performed confirming a lesion in the right occipital temporal region with surrounding edema.  She was started on steroids and gabapentin.  She had improvement in her left upper extremity symptoms after starting the medicines.  She was referred to Dr. Gomez for neurosurgical evaluation. She was seen in the office on 11/14/2022 and surgery was discussed. She had an appointment at [facility] recently. She has decided to proceed with surgery with Dr. Gomez. She was admitted for surgery on 12/6/2022.     Procedure(s) (LRB):  CRANIOTOMY, WITH NEOPLASM EXCISION USING COMPUTER-ASSISTED NAVIGATION (Right)       Hospital Course: The patient was taken to the operating room on 12/6/2022 her right craniotomy with excision of metastatic tumor.  She tolerated the procedure well was admitted to the ICU postoperatively.  Today she reports minimal pain.  Her  pain is controlled with pain medications.  She was able to increase activity without any issues.  She worked with therapy with minimal assistance.  She is voiding without issues.  She is tolerating p.o. intake.  She does not have any new deficits on exam.  She is stable for discharge home with home health.      Consults:   Consults (From admission, onward)        Status Ordering Provider     Inpatient consult to Social Work/Case Management  Once        Provider:  (Not yet assigned)    Acknowledged KAREN SARMIENTO          Significant Diagnostic Studies: Labs:   BMP:   Recent Labs   Lab 12/07/22 0159      K 4.3   CO2 24   BUN 17.9   CREATININE 0.67   CALCIUM 7.4*   , CMP   Recent Labs   Lab 12/07/22 0159      K 4.3   CO2 24   BUN 17.9   CREATININE 0.67   CALCIUM 7.4*    and CBC   Recent Labs   Lab 12/07/22 0159   WBC 14.0*   HGB 11.3*   HCT 34.3*        Radiology: MRI:  MRI BRAIN W WO CONTRAST     CLINICAL HISTORY:  post-op crani;     TECHNIQUE:  Multiplanar MRI sequences were performed of the brain without and following administration gadolinium based contrast.     COMPARISON:  Occipital MRI brain November 3, 2022     FINDINGS:  There has been interval right craniotomy with resection of the large heterogeneous enhancing occipital lobe mass.  There are mild peripheral enhancements about the resection cavity without definite enhancing residual tumor load.  Surgical cavity contains some internal and peripheral blood products.  There is surrounding brain edematous change which results in effacement of the sulci and compressive effect upon the occipital horn of the right lateral ventricle.  Right anterior postsurgical pneumocephalus.  There is mild right anterior dural thickening and enhancement. There is no diffusion restriction or ADC map signal drop out to suggest acute infarct. The sella and suprasellar areas are unremarkable.     The cerebellar tonsils are normally positioned. There is no acute  intracranial hemorrhage, hydrocephalus, midline shift or mass effect. No acute extra axial fluid collections identified. The mastoid air cells are clear.     Impression:     Right occipital lobe postsurgical changes without apparent residual enhancing tumor load.        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 Go on 12/13/2022.    Specialty: Neurosurgery  Why: 12/13 at 9:30 for staple removal; 12/19 at 9:30 for 2 week post-op appointment  Contact information:  [address] [place] [address] [address]  [address]  [facility] [address] [phone]                       Patient Instructions:   -Keep incision dressed until 12/9/2022. OK to remove dressing on 12/9/2022 and begin showering/getting incision wet, leave open to air.   -Call with any incisional redness, swelling or drainage.   -Call with any worsening/uncontrolled pain, weakness, excessive sleepiness, etc.  -Resume Coumadin tomorrow, 12/8/2022     -Taper decadron as follows:              -4mg twice a day on 12/7-12/10              -2mg three times a day on 12/11-12/14              -2mg twice a day on 12/15-/12/17              -2mg once a day on 12/1-12/20, then stop              -call with any new/worsening symptoms while weaning  steroids    Medications:  Reconciled Home Medications:      Medication List      START taking these medications    HYDROcodone-acetaminophen 7.5-325 mg per tablet  Commonly known as: NORCO  Take 1 tablet by mouth every 4 (four) hours as needed for Pain.     levETIRAcetam 500 MG Tab  Commonly known as: KEPPRA  Take 1 tablet (500 mg total) by mouth every 12 (twelve) hours.        CHANGE how you take these medications    ALPRAZolam 0.5 MG tablet  Commonly known as: XANAX  TAKE ONE TABLET BY MOUTH AT BEDTIME  What changed:   · how much to take  · when to take this  · reasons to take this     dexAMETHasone 2 MG tablet  Commonly known as: DECADRON  Take 2 tablets (4 mg total) by mouth every 12 (twelve) hours for 4 days, THEN 1 tablet (2 mg total) every 8 (eight) hours for 4 days, THEN 1 tablet (2 mg total) every 12 (twelve) hours for 3 days, THEN 1 tablet (2 mg total) every 12 (twelve) hours for 3 days.  Start taking on: December 7, 2022  What changed:   · medication strength  · See the new instructions.        CONTINUE taking these medications    calcium-vitamin D3 500 mg-5 mcg (200 unit) per tablet  Commonly known as: OS-KENA 500 + D3  Take 1 tablet by mouth Daily.     gabapentin 100 MG capsule  Commonly known as: NEURONTIN  Take 100 mg by mouth 2 (two) times daily.     lisinopriL 20 MG tablet  Commonly known as: PRINIVIL,ZESTRIL  Take 20 mg by mouth Daily.     loratadine 10 mg dissolvable tablet  Commonly known as: CLARITIN REDITABS  Take 10 mg by mouth Daily.     metoprolol succinate 25 MG 24 hr tablet  Commonly known as: TOPROL-XL  Take 25 mg by mouth once daily.     nystatin 100,000 unit/mL suspension  Commonly known as: MYCOSTATIN  Take 500,000 Units by mouth.     warfarin 4 MG tablet  Commonly known as: COUMADIN  Take 4 mg by mouth Daily.        STOP taking these medications    oxyCODONE 5 MG immediate release tablet  Commonly known as: ROXICODONE            JENNIFER ChristiansonP-BC  Neurosurgery  [facility] - 7th Floor ICU

## 2022-12-07 NOTE — NURSING
Nurses Note -- 4 Eyes      12/7/2022   12:17 PM      Skin assessed during: Daily Assessment      [x] No Pressure Injuries Present    [x]Prevention Measures Documented      [] Yes- Altered Skin Integrity Present or Discovered   [] LDA Added if Not in Epic (Describe Wound)   [] New Altered Skin Integrity was Present on Admit and Documented in LDA   [] Wound Image Taken    Wound Care Consulted? No    Attending Nurse:  Butch Wesley RN     Second RN/Staff Member:  Ruthy Baldwin RN

## 2022-12-07 NOTE — PLAN OF CARE
FOC obtained for [facility] and referral was submitted via [facility]. DME referral and order for rolling walker submitted to [facility]'s via fax.

## 2022-12-07 NOTE — HOSPITAL COURSE
The patient was taken to the operating room on 12/6/2022 her right craniotomy with excision of metastatic tumor.  She tolerated the procedure well was admitted to the ICU postoperatively.  Today she reports minimal pain.  Her pain is controlled with pain medications.  She was able to increase activity without any issues.  She worked with therapy with minimal assistance.  She is voiding without issues.  She is tolerating p.o. intake.  She does not have any new deficits on exam.  She is stable for discharge home with home health.

## 2022-12-08 PROCEDURE — G0180 PR HOME HEALTH MD CERTIFICATION: ICD-10-PCS | Mod: ,,, | Performed by: NEUROLOGICAL SURGERY

## 2022-12-08 PROCEDURE — G0180 MD CERTIFICATION HHA PATIENT: HCPCS | Mod: ,,, | Performed by: NEUROLOGICAL SURGERY

## 2022-12-08 NOTE — OP NOTE
DATE OF OPERATION:   December 6, 2022     PREOPERATIVE DIAGNOSIS:   1. Right posterior temporo-occipital metastasis      POSTOPERATIVE DIAGNOSIS:   1. Right posterior temporo-occipital metastasis      SURGEON:  Theron Gomez M.D.    ASSISTANT: MARÍA Wilcox     PROCEDURE:   1.  Right temporo occipital craniotomy with gross total resection of metastatic tumor    2.  Stereotactic volumetric resection with the Stealth neuro-navigation system   3.  Microdissection for intracranial procedure     ANESTHESIA:   General endotracheal     BLOOD LOSS:   200 cc     SPECIMEN(s):   Tumor for touch prep/frozen and permanent pathology     COMPLICATIONS:   None     DRAINS:   None    HISTORY:   The patient is a 67-year-old woman who was 1st diagnosed with breast cancer in 1999.  She 1st developed metastases in 2018 involving the right ovary and right iliac crest.  She is had multiple pathologic compression fractures and then recently developed left-sided neck shoulder and arm pain consistent with cervical radiculopathy.  MRI of the cervical spine did show degenerative changes, but also found, after further brain imaging, a large right temporal occipital mass with marked vasogenic edema.  Options were discussed and surgery was elected.  The patient understood and accepted the nature of this surgery as well as its attendant risks.     FINDINGS:   There were no untoward findings.  The mass was we minimally vascular, very hard and rubbery, and much more adherent to the brain surface itself rather than the tentorium or transverse sigmoid junction.  Venous drainage from the temporal lobe was preserved.  The tumor was removed in its entirety.  The ventricle was entered and then covered with a piece of Gelfoam and dural sealant.  The patient tolerated the procedure well.    PROCEDURE IN DETAIL:   Prior to surgery, the patient underwent MRI scanning with fiducial markers in place.? Then the image data was transferred to the  computer and a 3D model was built..? The patient was then brought to the operating room.? After endotracheal intubation and induction of general anesthesia the patient was positioned in the lateral position on a bean bag to optimize the venous drainage and location of the tumor resection. ?The patient received intravenous antibiotics prior to the start of the procedure and, if necessary, at regular intervals throughout the surgery.? The head was shaved, prepped and draped in the usual fashion after outlining the tumor and transverse sinus on the skin surface and a horseshoe shaped incision was marked out overlying the proposed site of tumor resection. The incision was infiltrated with 0.5% xylocaine containing 1:200,000 epinephrine.? The incision was carried down through the skin and subcutaneous tissues with the knife and then Sally clips were applied to the scalp edges.? The flap was reflected and held in place with blunt fishhooks? The air-driven  was used to enter the cranium circumferentially around the area of the tumor. The craniotome was then used to elevate a flap.    Then the area of the tumor was then identified with the neuronavigation system, and then the dura was opened circumferentially around this.  Then the operating microscope was brought into place.? As described above, the tumor was very hard and rubbery.  It was actually minimally vascular internally.  There was a good bit of pial vascular supply circumferentially.  The tumor was sent for frozen/touch prep, as well as permanent section.? Tumor was actually not particularly adherent except in 1 very small area to the tentorium or dura.  2 large draining veins consistent with vein of Fidencio day were preserved as they entered the transverse sigmoid junction.  Deepest aspect of the tumor, as expected, did involve the lateral wall of the ventricle.  The opening was covered with a piece of Gelfoam and then glued in place with Adherus dural  sealant.  The brain surface was then layered with fibrillar Surgicel after achieving hemostasis with bipolar cautery where necessary.? Then dural margins were tacked up circumferentially and fibrillar Surgicel cigarettes were used.? The dura could not be closed in a watertight fashion so a layer of dura matrix onlay plus was used and this was sealed with Adherus dural sealant.  Then the bone flap was replaced and secured with the titanium cranial plating system.     The wound was irrigated copiously with antibiotic irrigation and then closed with 2-0 Vicryl for the galea and staples for the skin edges.? A local dressing?was applied and the patient was taken to postanesthesia care unit in satisfactory condition with correct sponge and needle counts.

## 2022-12-08 NOTE — ANESTHESIA POSTPROCEDURE EVALUATION
Anesthesia Post Evaluation    Patient: Kaykay Crespo    Procedure(s) Performed: Procedure(s) (LRB):  CRANIOTOMY, WITH NEOPLASM EXCISION USING COMPUTER-ASSISTED NAVIGATION (Right)    Final Anesthesia Type: general      Patient location during evaluation: PACU  Patient participation: Yes- Able to Participate  Level of consciousness: awake and alert  Post-procedure vital signs: reviewed and stable  Pain management: adequate  Airway patency: patent      Anesthetic complications: no      Cardiovascular status: blood pressure returned to baseline  Respiratory status: unassisted  Hydration status: euvolemic  Follow-up not needed.          Vitals Value Taken Time   /84 12/07/22 1045   Temp 36.2 °C (97.1 °F) 12/07/22 0000   Pulse 64 12/07/22 0820   Resp 17 12/07/22 0820   SpO2 98 % 12/07/22 0820   Vitals shown include unvalidated device data.      Event Time   Out of Recovery 18:00:00         Pain/Sujey Score: Pain Rating Prior to Med Admin: 4 (12/7/2022 10:45 AM)

## 2022-12-08 NOTE — PT/OT/SLP EVAL
Physical Therapy Evaluation    Patient Name:  Kaykay Crespo   MRN:  [id number]    Recommendations:     Discharge Recommendations: home w/ HH PT and a RW  Discharge Equipment Recommendations:     Barriers to discharge: None    Assessment:     Kaykay Crespo is a 67 y.o. female admitted with a medical diagnosis of Neoplasm of brain causing mass effect on adjacent structures s/p craniotomy.  She presents with the following impairments/functional limitations: weakness. Pt is safe to DC home w/ friends that are caregivers. Pt would benefit from a RW at DC and HH PT    Rehab Prognosis: Good; patient would benefit from acute skilled PT services to address these deficits and reach maximum level of function.    Recent Surgery: Procedure(s) (LRB):  CRANIOTOMY, WITH NEOPLASM EXCISION USING COMPUTER-ASSISTED NAVIGATION (Right) 2 Days Post-Op    Plan:     During this hospitalization, patient to be seen   to address the identified rehab impairments via   and progress toward the following goals:    Plan of Care Expires:       Subjective     Chief Complaint: ready to go home  Patient/Family Comments/goals: go home  Pain/Comfort:       Patients cultural, spiritual, Evangelical conflicts given the current situation:      Living Environment:  Pt will be going home with friends who are Rns.   Upon discharge, patient will have assistance from friends.    Objective:     Communicated with RN prior to session.  Patient found up in chair upon PT entry to room.    General Precautions: Standard,    Respiratory Status: Room air    Exams:  RLE ROM: WFL  RLE Strength: WFL  LLE ROM: WFL  LLE Strength: WFL    Functional Mobility:  Transfers:     Sit to Stand:  contact guard assistance with rolling walker  Gait: Pt ambulated 200' w/ RW, CGA for safety. Pt tolerated well        Patient left up in chair with all lines intact.    No goals created. Pt has Dc'd from hospital at time of this note    GOALS:   Multidisciplinary Problems        Physical Therapy Goals          Problem: Physical Therapy    Goal Priority Disciplines Outcome Goal Variances Interventions   Physical Therapy Goal     PT, PT/OT                          History:     Past Medical History:   Diagnosis Date    Anxiety disorder, unspecified     Cerebellar mass     Compression fracture of L1 lumbar vertebra     History of DVT (deep vein thrombosis)     HLD (hyperlipidemia)     HTN (hypertension)     Metastatic breast cancer     Osteoporosis     PONV (postoperative nausea and vomiting)        Past Surgical History:   Procedure Laterality Date    BONE BIOPSY      CATARACT EXTRACTION Left     COLONOSCOPY      ESOPHAGOGASTRODUODENOSCOPY      LASIK Bilateral     MASTECTOMY Right     with lymph nodes    OOPHORECTOMY Bilateral 2018    SPLENECTOMY      TONSILLECTOMY, ADENOIDECTOMY      TUBAL LIGATION  1986       Time Tracking:     PT Received On: 12/07/22  PT Start Time: 1130  PT Stop Time: 1145  PT Total Time (min):       Billable Minutes: Evaluation 15      12/08/2022

## 2022-12-09 LAB
ABO + RH BLD: NORMAL
ABO + RH BLD: NORMAL
BLD PROD TYP BPU: NORMAL
BLD PROD TYP BPU: NORMAL
BLOOD UNIT EXPIRATION DATE: NORMAL
BLOOD UNIT EXPIRATION DATE: NORMAL
BLOOD UNIT TYPE CODE: 600
BLOOD UNIT TYPE CODE: 600
CROSSMATCH INTERPRETATION: NORMAL
CROSSMATCH INTERPRETATION: NORMAL
DISPENSE STATUS: NORMAL
DISPENSE STATUS: NORMAL
UNIT NUMBER: NORMAL
UNIT NUMBER: NORMAL

## 2022-12-13 ENCOUNTER — CLINICAL SUPPORT (OUTPATIENT)
Dept: NEUROSURGERY | Facility: CLINIC | Age: 67
End: 2022-12-13
Payer: MEDICARE

## 2022-12-13 NOTE — PROGRESS NOTES
[facility]  Neurosurgery    CHIEF COMPLAINT:    Post-operative wound check/staple removal    HPI:    Kaykay Crespo is a 67 y.o.-year-old female who presents today for staple removal.  She is s/p craniotomy for brain tumor that was done on 12/6/2022.  She denies fevers, chills, night sweats or N/V. Overall she is doing well. Reny stepped in and answered all the questions she had.       Review of patient's allergies indicates:  No Known Allergies    Current Outpatient Medications   Medication Sig Dispense Refill    ALPRAZolam (XANAX) 0.5 MG tablet TAKE ONE TABLET BY MOUTH AT BEDTIME (Patient taking differently: Take 0.25 mg by mouth nightly as needed.) 30 tablet 5    calcium-vitamin D3 (OS-KENA 500 + D3) 500 mg-5 mcg (200 unit) per tablet Take 1 tablet by mouth Daily.      dexAMETHasone (DECADRON) 2 MG tablet Take 2 tablets (4 mg total) by mouth every 12 (twelve) hours for 4 days, THEN 1 tablet (2 mg total) every 8 (eight) hours for 4 days, THEN 1 tablet (2 mg total) every 12 (twelve) hours for 3 days, THEN 1 tablet (2 mg total) every 12 (twelve) hours for 3 days. 40 tablet 0    gabapentin (NEURONTIN) 100 MG capsule Take 100 mg by mouth 2 (two) times daily.      HYDROcodone-acetaminophen (NORCO) 7.5-325 mg per tablet Take 1 tablet by mouth every 4 (four) hours as needed for Pain. 42 tablet 0    levETIRAcetam (KEPPRA) 500 MG Tab Take 1 tablet (500 mg total) by mouth every 12 (twelve) hours. 60 tablet 11    lisinopriL (PRINIVIL,ZESTRIL) 20 MG tablet Take 20 mg by mouth Daily.      loratadine (CLARITIN REDITABS) 10 mg dissolvable tablet Take 10 mg by mouth Daily.      metoprolol succinate (TOPROL-XL) 25 MG 24 hr tablet Take 25 mg by mouth once daily.      nystatin (MYCOSTATIN) 100,000 unit/mL suspension Take 500,000 Units by mouth.      pantoprazole (PROTONIX) 40 MG tablet Take 1 tablet (40 mg total) by mouth once daily. 30 tablet 11    sucralfate (CARAFATE) 100 mg/mL suspension Take 10 mLs (1 g  total) by mouth 4 (four) times daily before meals and nightly. 414 mL 3    warfarin (COUMADIN) 4 MG tablet Take 4 mg by mouth Daily.       No current facility-administered medications for this visit.       Past Medical History:   Diagnosis Date    Anxiety disorder, unspecified     Cerebellar mass     Compression fracture of L1 lumbar vertebra     History of DVT (deep vein thrombosis)     HLD (hyperlipidemia)     HTN (hypertension)     Metastatic breast cancer     Osteoporosis     PONV (postoperative nausea and vomiting)      Past Surgical History:   Procedure Laterality Date    BONE BIOPSY      CATARACT EXTRACTION Left     COLONOSCOPY      ESOPHAGOGASTRODUODENOSCOPY      LASIK Bilateral     MASTECTOMY Right     with lymph nodes    OOPHORECTOMY Bilateral 2018    SPLENECTOMY      TONSILLECTOMY, ADENOIDECTOMY      TUBAL LIGATION  1986     Family History    None       Social History     Socioeconomic History    Marital status:    Tobacco Use    Smoking status: Every Day     Packs/day: 0.50     Types: Cigarettes    Smokeless tobacco: Never   Substance and Sexual Activity    Alcohol use: Not Currently     Comment: occasionally    Drug use: Never         Physical Exam:    General: well developed, well nourished, no distress  Neurologic: Alert and oriented. Thought content appropriate.   Cranial nerves: face symmetric, pupils equal, round, reactive to light with accomodation, EOMI.   Motor Strength: moves all extremities with good strength and tone      Right scalp  incision:  C/D/I  Wound edges well-approximated  Staples intact, removed without difficulty    Gait:  unsteady, uses walker at home.        Assessment/Plan:     -Keep incision open to air   -Can shower and get incision wet, just pat dry and no vigorous scrubbing. Do not submerge incision for another 2 weeks.   -Follow up with Dr. Gomez as scheduled on 12/19/2022.  -Encouraged patient to call if they have any questions or concerns prior to next follow up  appt      Nanda Scott LPN

## 2022-12-16 NOTE — PROGRESS NOTES
[facility]  Neurosurgery        Kaykay Crespo   [id number]   1955       SUBJECTIVE:     CHIEF COMPLAINT:    2 weeks post-op    HPI:    Kaykay Crespo is a 67 y.o.-year-old female who presents today for post-operative follow-up.  She is s/p right temporo-occipital craniotomy for excision of metastatic tumor that was done on 12/6/22.  She is not experiencing any pain today.  She continues to have some problems with memory, but denies any change in this.  She reports improvement in vision since the surgery.  She is no longer seeing flashes of light.  She has been tapering off her Decadron without problems.  She will take her last dose on Wednesday.  She complains of cramping in the left hand when she uses it for long periods.  She continues with intermittent pain in the left forearm, hand, and last two fingers.  She is no longer having neck pain, but attributes this to being on the steroids.        Review of patient's allergies indicates:  No Known Allergies  Current Outpatient Medications   Medication Sig Dispense Refill    ALPRAZolam (XANAX) 0.5 MG tablet TAKE ONE TABLET BY MOUTH AT BEDTIME (Patient taking differently: Take 0.25 mg by mouth nightly as needed.) 30 tablet 5    calcium-vitamin D3 (OS-KENA 500 + D3) 500 mg-5 mcg (200 unit) per tablet Take 1 tablet by mouth Daily.      dexAMETHasone (DECADRON) 4 MG Tab 0.5 mg once daily.      gabapentin (NEURONTIN) 100 MG capsule Take 100 mg by mouth 2 (two) times daily.      HYDROcodone-acetaminophen (NORCO) 7.5-325 mg per tablet Take 1 tablet by mouth every 4 (four) hours as needed for Pain. 42 tablet 0    levETIRAcetam (KEPPRA) 500 MG Tab Take 1 tablet (500 mg total) by mouth every 12 (twelve) hours. 60 tablet 11    lisinopriL 10 MG tablet Take 10 mg by mouth once daily.      metoprolol succinate (TOPROL-XL) 25 MG 24 hr tablet Take 25 mg by mouth once daily.      nystatin (MYCOSTATIN) 100,000 unit/mL suspension Take 500,000 Units by mouth.    "   pantoprazole (PROTONIX) 40 MG tablet Take 1 tablet (40 mg total) by mouth once daily. 30 tablet 11    triamcinolone acetonide 0.1% (KENALOG) 0.1 % paste Place onto teeth.      warfarin (COUMADIN) 4 MG tablet Take 4 mg by mouth Daily.      loratadine (CLARITIN REDITABS) 10 mg dissolvable tablet Take 10 mg by mouth Daily.      sucralfate (CARAFATE) 100 mg/mL suspension Take 10 mLs (1 g total) by mouth 4 (four) times daily before meals and nightly. (Patient not taking: Reported on 12/19/2022) 414 mL 3     No current facility-administered medications for this visit.     Past Medical History:   Diagnosis Date    Anxiety disorder, unspecified     Cerebellar mass     Compression fracture of L1 lumbar vertebra     History of DVT (deep vein thrombosis)     HLD (hyperlipidemia)     HTN (hypertension)     Metastatic breast cancer     Osteoporosis     PONV (postoperative nausea and vomiting)      Past Surgical History:   Procedure Laterality Date    BONE BIOPSY      CATARACT EXTRACTION Left     COLONOSCOPY      ESOPHAGOGASTRODUODENOSCOPY      LASIK Bilateral     MASTECTOMY Right     with lymph nodes    OOPHORECTOMY Bilateral 2018    SPLENECTOMY      TONSILLECTOMY, ADENOIDECTOMY      TUBAL LIGATION  1986     Family History    None       Social History     Socioeconomic History    Marital status:    Tobacco Use    Smoking status: Every Day     Packs/day: 0.50     Types: Cigarettes    Smokeless tobacco: Never   Substance and Sexual Activity    Alcohol use: Not Currently     Comment: occasionally    Drug use: Never         Review of systems:    Pertinent items are noted in HPI.      OBJECTIVE:     Vital Signs  Pulse: 85  Resp: 16  BP: 134/80  Pain Score: 0-No pain  Height: 5' 5" (165.1 cm)  Weight: 68.9 kg (152 lb)  Body mass index is 25.29 kg/m².      Physical Exam:    General:  Pleasant. Well-nourished. Alert. No acute distress.    Head:  Normocephalic, without obvious abnormality, atraumatic    Right temporal scalp  " incision:  C/D/I  Wound edges well-approximated  healing well    Lungs:   Breathing is quiet, non-lablored    Neurological:    Oriented to Person, Place, Time   Speech:  normal  Memory, cognition, and affect are appropriate.  Extraocular movements are intact.  Movements of facial expression are intact and symmetric.  Motor Strength: Moves all extremities spontaneously with good tone.  No abnormal movements seen.    Gait:  normal    Musculoskeletal:  Tinel's is positive at the elbow on the left    ASSESSMENT/PLAN:     1. Brain metastases  - Ambulatory referral/consult to Radiation Oncology; postoperative radiation treatment  -Follow up after first post treatment MRI (@ 3mos)    2.  Left cubital tunnel syndrome  - discussed things to look out for an options should her symptoms worsen.      I, Dr. Theron Gomez, personally performed the services described in this documentation. All medical record entries made by the scribe, Nanda Oliveira RN, were at my direction and in my presence.  I have reviewed the chart and agree that the record reflects my personal performance and is accurate and complete. Theron Gomez MD.  1:19 PM 12/19/2022           Theron Gomez MD FACS [facility]

## 2022-12-19 ENCOUNTER — OFFICE VISIT (OUTPATIENT)
Dept: NEUROSURGERY | Facility: CLINIC | Age: 67
End: 2022-12-19
Payer: MEDICARE

## 2022-12-19 VITALS
SYSTOLIC BLOOD PRESSURE: 134 MMHG | HEART RATE: 85 BPM | WEIGHT: 152 LBS | HEIGHT: 65 IN | DIASTOLIC BLOOD PRESSURE: 80 MMHG | BODY MASS INDEX: 25.33 KG/M2 | RESPIRATION RATE: 16 BRPM

## 2022-12-19 DIAGNOSIS — C79.31 BRAIN METASTASES: Primary | ICD-10-CM

## 2022-12-19 PROCEDURE — 99024 PR POST-OP FOLLOW-UP VISIT: ICD-10-PCS | Mod: POP,,, | Performed by: NEUROLOGICAL SURGERY

## 2022-12-19 PROCEDURE — 99024 POSTOP FOLLOW-UP VISIT: CPT | Mod: POP,,, | Performed by: NEUROLOGICAL SURGERY

## 2022-12-19 RX ORDER — LISINOPRIL 10 MG/1
10 TABLET ORAL DAILY
COMMUNITY
Start: 2022-12-09

## 2022-12-19 RX ORDER — DEXAMETHASONE 4 MG/1
0.5 TABLET ORAL DAILY
COMMUNITY
Start: 2022-12-07

## 2022-12-19 RX ORDER — TRIAMCINOLONE ACETONIDE 1 MG/G
PASTE DENTAL
COMMUNITY
Start: 2022-12-13 | End: 2023-06-11

## 2022-12-22 ENCOUNTER — EXTERNAL HOME HEALTH (OUTPATIENT)
Dept: HOME HEALTH SERVICES | Facility: HOSPITAL | Age: 67
End: 2022-12-22
Payer: MEDICARE

## 2022-12-27 ENCOUNTER — APPOINTMENT (OUTPATIENT)
Dept: RADIATION THERAPY | Facility: HOSPITAL | Age: 67
End: 2022-12-27
Attending: RADIOLOGY
Payer: MEDICARE

## 2022-12-27 PROCEDURE — 77334 RADIATION TREATMENT AID(S): CPT | Performed by: RADIOLOGY

## 2022-12-27 PROCEDURE — 77470 SPECIAL RADIATION TREATMENT: CPT | Performed by: RADIOLOGY

## 2023-01-03 ENCOUNTER — DOCUMENT SCAN (OUTPATIENT)
Dept: HOME HEALTH SERVICES | Facility: HOSPITAL | Age: 68
End: 2023-01-03
Payer: MEDICARE

## 2023-01-04 ENCOUNTER — OFFICE VISIT (OUTPATIENT)
Dept: RADIATION THERAPY | Facility: HOSPITAL | Age: 68
End: 2023-01-04
Attending: RADIOLOGY
Payer: MEDICARE

## 2023-01-04 PROCEDURE — 77300 RADIATION THERAPY DOSE PLAN: CPT | Performed by: RADIOLOGY

## 2023-01-04 PROCEDURE — 77295 3-D RADIOTHERAPY PLAN: CPT | Performed by: RADIOLOGY

## 2023-01-05 PROCEDURE — 77334 RADIATION TREATMENT AID(S): CPT | Performed by: RADIOLOGY

## 2023-01-05 PROCEDURE — 77280 THER RAD SIMULAJ FIELD SMPL: CPT | Performed by: RADIOLOGY

## 2023-01-05 PROCEDURE — 77373 STRTCTC BDY RAD THER TX DLVR: CPT | Performed by: RADIOLOGY

## 2023-01-06 PROCEDURE — 77373 STRTCTC BDY RAD THER TX DLVR: CPT | Performed by: RADIOLOGY

## 2023-01-09 PROCEDURE — 77373 STRTCTC BDY RAD THER TX DLVR: CPT | Performed by: RADIOLOGY

## 2023-01-09 PROCEDURE — 77336 RADIATION PHYSICS CONSULT: CPT | Performed by: RADIOLOGY

## 2023-01-09 PROCEDURE — G0340 ROBT LIN-RADSURG FRACTX 2-5: HCPCS | Performed by: RADIOLOGY

## 2023-01-10 PROCEDURE — 77373 STRTCTC BDY RAD THER TX DLVR: CPT | Performed by: RADIOLOGY

## 2023-01-11 PROCEDURE — 77373 STRTCTC BDY RAD THER TX DLVR: CPT | Performed by: RADIOLOGY

## 2023-01-24 ENCOUNTER — DOCUMENT SCAN (OUTPATIENT)
Dept: HOME HEALTH SERVICES | Facility: HOSPITAL | Age: 68
End: 2023-01-24
Payer: MEDICARE

## 2023-02-01 ENCOUNTER — TELEPHONE (OUTPATIENT)
Dept: NEUROSURGERY | Facility: CLINIC | Age: 68
End: 2023-02-01
Payer: MEDICARE

## 2023-02-01 DIAGNOSIS — C79.31 BRAIN METASTASES: Primary | ICD-10-CM

## 2023-02-01 RX ORDER — LEVETIRACETAM 500 MG/1
500 TABLET ORAL EVERY 12 HOURS
Qty: 60 TABLET | Refills: 11 | Status: SHIPPED | OUTPATIENT
Start: 2023-02-01 | End: 2024-02-01

## 2023-02-01 NOTE — TELEPHONE ENCOUNTER
I spoke with the patient and let her know that she should continue the Keppra for the time being.  Refill was sent to [facility].  She reports no seizure activity before or after the surgery.  She is not sure if she is having side effects from the Keppra, as she is on so many medications currently.  I told her we could discuss further when she comes in for follow up on 3/13/23.  I gave her the appointment date and time.  She is scheduled for her MRI brain on 3/6 and will follow up with Dr. López on 3/9.  She will call with any problems or changes.

## 2023-02-22 ENCOUNTER — OFFICE VISIT (OUTPATIENT)
Dept: NEUROSURGERY | Facility: CLINIC | Age: 68
End: 2023-02-22
Payer: MEDICARE

## 2023-02-22 VITALS
RESPIRATION RATE: 16 BRPM | DIASTOLIC BLOOD PRESSURE: 78 MMHG | HEIGHT: 65 IN | SYSTOLIC BLOOD PRESSURE: 121 MMHG | HEART RATE: 74 BPM | WEIGHT: 150.81 LBS | BODY MASS INDEX: 25.13 KG/M2

## 2023-02-22 DIAGNOSIS — G56.22 ULNAR NEUROPATHY AT ELBOW, LEFT: ICD-10-CM

## 2023-02-22 DIAGNOSIS — G56.02 LEFT CARPAL TUNNEL SYNDROME: Primary | ICD-10-CM

## 2023-02-22 DIAGNOSIS — M47.22 CERVICAL SPONDYLOSIS WITH RADICULOPATHY: ICD-10-CM

## 2023-02-22 PROCEDURE — 99024 POSTOP FOLLOW-UP VISIT: CPT | Mod: POP,,, | Performed by: PHYSICIAN ASSISTANT

## 2023-02-22 PROCEDURE — 99024 PR POST-OP FOLLOW-UP VISIT: ICD-10-PCS | Mod: POP,,, | Performed by: PHYSICIAN ASSISTANT

## 2023-02-22 RX ORDER — PSEUDOEPHEDRINE HCL 30 MG
1 TABLET ORAL
COMMUNITY
Start: 2022-11-30

## 2023-02-22 RX ORDER — GABAPENTIN 400 MG/1
400 CAPSULE ORAL 3 TIMES DAILY
COMMUNITY
Start: 2023-01-09 | End: 2023-06-30 | Stop reason: SDUPTHER

## 2023-02-22 RX ORDER — OXYCODONE AND ACETAMINOPHEN 5; 325 MG/1; MG/1
1 TABLET ORAL EVERY 4 HOURS PRN
COMMUNITY
Start: 2023-01-09 | End: 2023-06-30

## 2023-02-22 RX ORDER — LOPERAMIDE HYDROCHLORIDE 2 MG/1
2 CAPSULE ORAL DAILY PRN
COMMUNITY
End: 2023-06-30

## 2023-02-22 NOTE — PROGRESS NOTES
[facility]  History & Physical  Neurosurgery      Kaykay Crespo   [id number]   1955       CHIEF COMPLAINT:  Left hand dysfunction    HPI:  Kaykay Crespo is a 67 y.o. female who presents for follow up appointment.  On 12/06/2022, the patient underwent right posterior temporal occipital craniotomy for excision of metastatic lesion.  Prior to that surgery, she had pain along the ulnar side of the elbow, volar/ulna side of forearm and into the ulnar side of her hand.  She reports the pain was terrible early on after surgery.  In time, her symptoms have improved.  Currently she continues with pain from the left wrist and into her hand, mostly volar.  At times, the pain is in the 2nd and 3rd fingers.  At times, the pain is into the 4th and 5th fingers.  She awakens at night with the left hand asleep and tingling.  Due to these persistent symptoms, she was asked to present for evaluation.      Past Medical History:   Diagnosis Date    Anxiety disorder, unspecified     Cerebellar mass     Compression fracture of L1 lumbar vertebra     History of DVT (deep vein thrombosis)     HLD (hyperlipidemia)     HTN (hypertension)     Metastatic breast cancer     Osteoporosis     PONV (postoperative nausea and vomiting)        Past Surgical History:   Procedure Laterality Date    BONE BIOPSY      CATARACT EXTRACTION Left     COLONOSCOPY      CRANIOTOMY, WITH NEOPLASM EXCISION USING COMPUTER-ASSISTED NAVIGATION Right 12/06/2022    posterior temporal occipital craniotomy for tumor.  Dr. Gomez    ESOPHAGOGASTRODUODENOSCOPY      LASIK Bilateral     MASTECTOMY Right     with lymph nodes    OOPHORECTOMY Bilateral 2018    SPLENECTOMY      TONSILLECTOMY, ADENOIDECTOMY      TUBAL LIGATION  1986       Family History   Problem Relation Age of Onset    Heart disease Mother        Social History     Socioeconomic History    Marital status:    Tobacco Use    Smoking status: Every Day     Packs/day: 0.50      Types: Cigarettes    Smokeless tobacco: Never   Substance and Sexual Activity    Alcohol use: Not Currently     Comment: occasionally    Drug use: Never       Review of patient's allergies indicates:  No Known Allergies     Medication List with Changes/Refills   Current Medications    ALPRAZOLAM (XANAX) 0.5 MG TABLET    TAKE ONE TABLET BY MOUTH AT BEDTIME    CALCIUM 500 500 MG CALCIUM (1,250 MG) CHEWABLE TABLET    Take 1 tablet by mouth.    CALCIUM-VITAMIN D3 (OS-KENA 500 + D3) 500 MG-5 MCG (200 UNIT) PER TABLET    Take 1 tablet by mouth Daily.    DEXAMETHASONE (DECADRON) 4 MG TAB    0.5 mg once daily.    GABAPENTIN (NEURONTIN) 100 MG CAPSULE    Take 100 mg by mouth 2 (two) times daily.    GABAPENTIN (NEURONTIN) 400 MG CAPSULE    Take 400 mg by mouth 3 (three) times daily.    HYDROCODONE-ACETAMINOPHEN (NORCO) 7.5-325 MG PER TABLET    Take 1 tablet by mouth every 4 (four) hours as needed for Pain.    LEVETIRACETAM (KEPPRA) 500 MG TAB    Take 1 tablet (500 mg total) by mouth every 12 (twelve) hours.    LISINOPRIL 10 MG TABLET    Take 10 mg by mouth once daily.    LOPERAMIDE (IMODIUM) 2 MG CAPSULE    Take 2 mg by mouth daily as needed.    LORATADINE (CLARITIN REDITABS) 10 MG DISSOLVABLE TABLET    Take 10 mg by mouth Daily.    METOPROLOL SUCCINATE (TOPROL-XL) 100 MG 24 HR TABLET    TAKE ONE TABLET BY MOUTH EVERY DAY    METOPROLOL SUCCINATE (TOPROL-XL) 25 MG 24 HR TABLET    Take 25 mg by mouth once daily.    NYSTATIN (MYCOSTATIN) 100,000 UNIT/ML SUSPENSION    Take 500,000 Units by mouth.    OXYCODONE-ACETAMINOPHEN (PERCOCET) 5-325 MG PER TABLET    Take 1 tablet by mouth every 4 (four) hours as needed.    PANTOPRAZOLE (PROTONIX) 40 MG TABLET    Take 1 tablet (40 mg total) by mouth once daily.    SUCRALFATE (CARAFATE) 100 MG/ML SUSPENSION    Take 10 mLs (1 g total) by mouth 4 (four) times daily before meals and nightly.    TRIAMCINOLONE ACETONIDE 0.1% (KENALOG) 0.1 % PASTE    Place onto teeth.    WARFARIN (COUMADIN) 4 MG  "TABLET    Take 4 mg by mouth Daily.        ROS:    Review of Systems   Constitutional:  Negative for chills and fever.   HENT:  Negative for nosebleeds and sore throat.    Eyes:  Negative for pain and visual disturbance.   Respiratory:  Negative for cough, chest tightness and shortness of breath.    Cardiovascular:  Negative for chest pain.   Gastrointestinal:  Negative for diarrhea, nausea and vomiting.   Genitourinary:  Negative for difficulty urinating, dysuria and hematuria.   Musculoskeletal:  Negative for gait problem, myalgias and neck pain.   Skin:  Negative for rash.   Neurological:  Positive for dizziness, weakness, numbness and headaches. Negative for facial asymmetry.   Psychiatric/Behavioral:  Negative for confusion and sleep disturbance. The patient is not nervous/anxious.        Physical Examination:    Vital Signs:  /78   Pulse 74   Resp 16   Ht 5' 5" (1.651 m)   Wt 68.4 kg (150 lb 12.8 oz)   LMP  (LMP Unknown)   BMI 25.09 kg/m²      General:  Pleasant. Well-nourished. Well-groomed.    Lungs:  Breathing is quiet, non-labored     Musculoskeletal:  Cervical ROM:  Moderately limited with extension, mildly limited with bilateral rotation.  Tinel's is negative at bilateral wrist and elbows.  Phalen's sign is negative bilaterally.    Neurological:    Oriented to Person, Place, Time   Muscle strength against resistance:  Strength  Deltoids Triceps Biceps Wrist Extension Wrist Flexion Intrinsics   Upper: R 5/5 4/5 5/5 5/5 5/5 5-/5    L 5/5 4+/5 5/5 5/5 5/5 5/5   Sensation is intact to primary modalities in bilateral upper extremities with the exception of  Reflexes:   Right Left   Triceps 1+ 1+   Biceps 0 0   Brachioradialis 1+ 2+   Patellar 2+ 2+   Achilles 2+ 2+   Sebastian's sign is negative bilaterally.  Gait is slow.      Imaging:  MRI of the cervical spine was obtained on 02/02/2023.  There is disc space narrowing and spondylosis most significant at C4-5 and C5-6 greater than C6-7 and C7-T1.  " At C4-5, there is severe left foraminal stenosis secondary to uncinate and facet hypertrophy.  At C5-6, there is disc/osteophyte complex along with uncinate and facet hypertrophy that causes moderate central and bilateral foraminal stenosis.  At C6-7, there is disc/osteophyte complex along with uncinate and facet hypertrophy which causes mild bilateral foraminal stenosis.  At C7-T1, there is central disc bulging without significant central or foraminal stenosis.      ASSESSMENT/PLAN:     1. Left carpal tunnel syndrome  Ambulatory referral/consult to Physical/Occupational Therapy    Ambulatory referral/consult to Neurology      2. Ulnar neuropathy at elbow, left  Ambulatory referral/consult to Physical/Occupational Therapy    Ambulatory referral/consult to Neurology      3. Cervical spondylosis with radiculopathy          Although she does have central and foraminal stenosis in the cervical spine, her symptoms appear to be more consistent with peripheral neuropathy.  She will obtain wrist splints to be worn at night when sleeping.  We also discussed keeping the arms straight at night.  She will undergo a course of occupational therapy.  We will also schedule electrical studies.  She will keep her follow-up appointment as scheduled which is the postoperative brain appointment.          Reny Godwin PA-C

## 2023-03-01 LAB
C1INH SERPL-MCNC: NORMAL MG/DL
DHEA SERPL-MCNC: NORMAL
ESTRIOL SERPL-MCNC: NORMAL NG/ML
ESTROGEN SERPL-MCNC: NORMAL PG/ML
INSULIN SERPL-ACNC: NORMAL U[IU]/ML
LAB AP CLINICAL INFORMATION: NORMAL
LAB AP GROSS DESCRIPTION: NORMAL
LAB AP INTRA OP: NORMAL
LAB AP REPORT FOOTNOTES: NORMAL
Lab: NORMAL
T3RU NFR SERPL: NORMAL %

## 2023-03-13 ENCOUNTER — OFFICE VISIT (OUTPATIENT)
Dept: NEUROSURGERY | Facility: CLINIC | Age: 68
End: 2023-03-13
Payer: MEDICARE

## 2023-03-13 VITALS
WEIGHT: 148 LBS | SYSTOLIC BLOOD PRESSURE: 125 MMHG | DIASTOLIC BLOOD PRESSURE: 85 MMHG | HEIGHT: 65 IN | BODY MASS INDEX: 24.66 KG/M2 | HEART RATE: 81 BPM

## 2023-03-13 DIAGNOSIS — M25.512 ACUTE PAIN OF LEFT SHOULDER: ICD-10-CM

## 2023-03-13 DIAGNOSIS — C79.31 BRAIN METASTASES: Primary | ICD-10-CM

## 2023-03-13 PROCEDURE — 99213 PR OFFICE/OUTPT VISIT, EST, LEVL III, 20-29 MIN: ICD-10-PCS | Mod: ,,, | Performed by: NEUROLOGICAL SURGERY

## 2023-03-13 PROCEDURE — 99213 OFFICE O/P EST LOW 20 MIN: CPT | Mod: ,,, | Performed by: NEUROLOGICAL SURGERY

## 2023-03-13 NOTE — PROGRESS NOTES
[facility]  Neurosurgery        Kaykay Crespo   [id number]   1955       SUBJECTIVE:     CHIEF COMPLAINT:    3 months post-op    HPI:    Kaykay Crespo is a 67 y.o.-year-old female who presents today for post-operative follow-up.  She is s/p right temporo-occipital craniotomy for excision of metastatic tumor that was done on 12/6/22.  She completed CyberKnife treatment of the resection cavity on 1/11/23.  She no longer has numbness in the right head over her incision.  She notices tenderness in this area when washing her hair.  She continues to have difficulty with balance, but does not feel it is worsening.  She reports feeling tired still and says she is still losing her hair.  She followed up with Dr. López last week.  She is planning another MRI in 3 months.    She continues with pain in the left neck, shoulder, and down the left arm to the hand.  She describes shocking pains down the left arm.  She has intermittent numbness in the dorsal and volar surfaces of the left hand.  Her symptoms began about one month after her craniotomy.  She has been treating with a chiropractor, Jairo Crowder.  She says he thinks her symptoms are due to problems with the rotator cuff.  She has not had any imaging of the shoulder.  She is scheduled for EMG's of the upper extremity in June with Dr. Chester.        Review of patient's allergies indicates:  No Known Allergies  Current Outpatient Medications   Medication Sig Dispense Refill    ALPRAZolam (XANAX) 0.5 MG tablet TAKE ONE TABLET BY MOUTH AT BEDTIME 30 tablet 5    CALCIUM 500 500 mg calcium (1,250 mg) chewable tablet Take 1 tablet by mouth.      calcium-vitamin D3 (OS-KENA 500 + D3) 500 mg-5 mcg (200 unit) per tablet Take 1 tablet by mouth Daily.      gabapentin (NEURONTIN) 100 MG capsule Take 100 mg by mouth 2 (two) times daily.      levETIRAcetam (KEPPRA) 500 MG Tab Take 1 tablet (500 mg total) by mouth every 12 (twelve) hours. 60 tablet 11     lisinopriL 10 MG tablet Take 10 mg by mouth once daily.      loratadine (CLARITIN REDITABS) 10 mg dissolvable tablet Take 10 mg by mouth Daily.      metoprolol succinate (TOPROL-XL) 100 MG 24 hr tablet TAKE ONE TABLET BY MOUTH EVERY DAY 90 tablet 3    metoprolol succinate (TOPROL-XL) 25 MG 24 hr tablet Take 25 mg by mouth once daily.      pantoprazole (PROTONIX) 40 MG tablet Take 1 tablet (40 mg total) by mouth once daily. 30 tablet 11    warfarin (COUMADIN) 4 MG tablet Take 4 mg by mouth Daily.      dexAMETHasone (DECADRON) 4 MG Tab 0.5 mg once daily.      gabapentin (NEURONTIN) 400 MG capsule Take 400 mg by mouth 3 (three) times daily.      HYDROcodone-acetaminophen (NORCO) 7.5-325 mg per tablet Take 1 tablet by mouth every 4 (four) hours as needed for Pain. (Patient not taking: Reported on 2/22/2023) 42 tablet 0    loperamide (IMODIUM) 2 mg capsule Take 2 mg by mouth daily as needed.      nystatin (MYCOSTATIN) 100,000 unit/mL suspension Take 500,000 Units by mouth.      oxyCODONE-acetaminophen (PERCOCET) 5-325 mg per tablet Take 1 tablet by mouth every 4 (four) hours as needed.      sucralfate (CARAFATE) 100 mg/mL suspension Take 10 mLs (1 g total) by mouth 4 (four) times daily before meals and nightly. (Patient not taking: Reported on 12/19/2022) 414 mL 3    triamcinolone acetonide 0.1% (KENALOG) 0.1 % paste Place onto teeth.       No current facility-administered medications for this visit.     Past Medical History:   Diagnosis Date    Anxiety disorder, unspecified     Cerebellar mass     Compression fracture of L1 lumbar vertebra     History of DVT (deep vein thrombosis)     HLD (hyperlipidemia)     HTN (hypertension)     Metastatic breast cancer     Osteoporosis     PONV (postoperative nausea and vomiting)      Past Surgical History:   Procedure Laterality Date    BONE BIOPSY      CATARACT EXTRACTION Left     COLONOSCOPY      CRANIOTOMY, WITH NEOPLASM EXCISION USING COMPUTER-ASSISTED NAVIGATION Right  "12/06/2022    posterior temporal occipital craniotomy for tumor.  Dr. Gomez    ESOPHAGOGASTRODUODENOSCOPY      LASIK Bilateral     MASTECTOMY Right     with lymph nodes    OOPHORECTOMY Bilateral 2018    SPLENECTOMY      TONSILLECTOMY, ADENOIDECTOMY      TUBAL LIGATION  1986     Family History       Problem Relation (Age of Onset)    Heart disease Mother          Social History     Socioeconomic History    Marital status:    Tobacco Use    Smoking status: Every Day     Packs/day: 0.50     Types: Cigarettes    Smokeless tobacco: Never   Substance and Sexual Activity    Alcohol use: Not Currently     Comment: occasionally    Drug use: Never         Review of systems:    Constitutional:  Negative for chills and fever.   HENT:  Negative for nosebleeds and sore throat.    Eyes:  Negative for pain and visual disturbance.   Respiratory:  Negative for cough, chest tightness and shortness of breath.    Cardiovascular:  Negative for chest pain.   Gastrointestinal:  Negative for diarrhea, nausea and vomiting.   Genitourinary:  Negative for difficulty urinating, dysuria and hematuria.   Musculoskeletal:  Negative for gait problem, myalgias and neck pain.   Skin:  Negative for rash.   Neurological:  Positive for dizziness, weakness, numbness and headaches. Negative for facial asymmetry.   Psychiatric/Behavioral:  Negative for confusion and sleep disturbance. The patient is not nervous/anxious.    3 month postop MRI shows some potential nodular recurrence, but should just be watched for now.    OBJECTIVE:     Vital Signs  Pulse: 81  BP: 125/85  Pain Score: 0-No pain  Height: 5' 5" (165.1 cm)  Weight: 67.1 kg (148 lb)  Body mass index is 24.63 kg/m².      Physical Exam:    General:  Pleasant. Well-nourished. Alert. No acute distress.    Head:  Normocephalic, without obvious abnormality, atraumatic    Right temporal scalp  incision:  Well healed    Lungs:   Breathing is quiet, non-lablored    Neurological:    Oriented to " Person, Place, Time   Speech:  normal  Memory, cognition, and affect are appropriate.  Extraocular movements are intact.  Movements of facial expression are intact and symmetric.  Motor Strength: Moves all extremities spontaneously with good tone.  No abnormal movements seen.    Gait:  normal    Musculoskeletal:  Mild pain in left shoulder with internal rotation; external rotation negative    ASSESSMENT/PLAN:     1. Brain metastases    2. Acute left shoulder pain    - MRI left shoulder w/o contrast; call w/ results  - Okay to d/c Keppra; instructions given  - Follow up after next brain MRI in about 3 months      I, Dr. Theron Gomez, personally performed the services described in this documentation. All medical record entries made by the scribe, Nanda Oliveira RN, were at my direction and in my presence.  I have reviewed the chart and agree that the record reflects my personal performance and is accurate and complete. Theron Gomez MD.  1:19 PM 03/13/2023           Theron Gomez MD FACS [facility]

## 2023-05-18 ENCOUNTER — TELEPHONE (OUTPATIENT)
Dept: NEUROSURGERY | Facility: CLINIC | Age: 68
End: 2023-05-18
Payer: MEDICARE

## 2023-05-18 DIAGNOSIS — C50.919 BREAST CANCER METASTASIZED TO BRAIN, UNSPECIFIED LATERALITY: ICD-10-CM

## 2023-05-18 DIAGNOSIS — C79.31 BREAST CANCER METASTASIZED TO BRAIN, UNSPECIFIED LATERALITY: ICD-10-CM

## 2023-05-18 DIAGNOSIS — R42 DIZZINESS: Primary | ICD-10-CM

## 2023-05-18 NOTE — TELEPHONE ENCOUNTER
I received a letter from [facility] regarding recall for Protonix that Salma received.  She said she stopped taking that long ago.      She is seeing Jairo Crowder and is improving.      She has concerns about dizziness.  She feels like she is on a carnival ride at times.  She gets dizzy at times with head movements.  I spoke to her about balance training at [facility].  Please send order for therapy.

## 2023-06-02 ENCOUNTER — TELEPHONE (OUTPATIENT)
Dept: NEUROSURGERY | Facility: CLINIC | Age: 68
End: 2023-06-02
Payer: MEDICARE

## 2023-06-02 NOTE — TELEPHONE ENCOUNTER
I received a call from Salma regarding concerns about scan obtained today.  I went over the scan with her and Gordon.  Multiple mets.  She is to see Dr. López on Tuesday.  I will discuss with Dr. Gomez.    She was started on decadron 4 mg bid.

## 2023-06-14 ENCOUNTER — OFFICE VISIT (OUTPATIENT)
Dept: NEUROSURGERY | Facility: CLINIC | Age: 68
End: 2023-06-14
Payer: MEDICARE

## 2023-06-14 VITALS
BODY MASS INDEX: 15.61 KG/M2 | WEIGHT: 103 LBS | SYSTOLIC BLOOD PRESSURE: 111 MMHG | DIASTOLIC BLOOD PRESSURE: 73 MMHG | HEIGHT: 68 IN | HEART RATE: 69 BPM

## 2023-06-14 DIAGNOSIS — G93.89 BRAIN MASS: Primary | ICD-10-CM

## 2023-06-14 PROCEDURE — 99213 PR OFFICE/OUTPT VISIT, EST, LEVL III, 20-29 MIN: ICD-10-PCS | Mod: ,,, | Performed by: NEUROLOGICAL SURGERY

## 2023-06-14 PROCEDURE — 99213 OFFICE O/P EST LOW 20 MIN: CPT | Mod: ,,, | Performed by: NEUROLOGICAL SURGERY

## 2023-06-14 RX ORDER — LETROZOLE 2.5 MG/1
TABLET, FILM COATED ORAL
COMMUNITY
Start: 2023-04-12

## 2023-06-14 NOTE — PROGRESS NOTES
[facility]  Neurosurgery  Established Patient      Kaykay Crespo   [id number]   1955       SUBJECTIVE:     History of Present Illness:  Patient is a 68 y.o. female who presents for a 3 month follow up for brain mets.  She is s/p right temporo-occipital craniotomy for excision of metastatic tumor that was done on 12/6/22.  She completed CyberKnife treatment of the resection cavity on 1/11/23.  She was doing okay until May when she began to have ringing in the left ear, decreased hearing and worsening dizziness/balance difficulty.  She was seen by an ENT.        Past Medical History:   Diagnosis Date    Anxiety disorder, unspecified     Cerebellar mass     Compression fracture of L1 lumbar vertebra     History of DVT (deep vein thrombosis)     HLD (hyperlipidemia)     HTN (hypertension)     Metastatic breast cancer     Osteoporosis     PONV (postoperative nausea and vomiting)       Past Surgical History:   Procedure Laterality Date    BONE BIOPSY      CATARACT EXTRACTION Left     COLONOSCOPY      CRANIOTOMY, WITH NEOPLASM EXCISION USING COMPUTER-ASSISTED NAVIGATION Right 12/06/2022    posterior temporal occipital craniotomy for tumor.  Dr. Gomez    ESOPHAGOGASTRODUODENOSCOPY      LASIK Bilateral     MASTECTOMY Right     with lymph nodes    OOPHORECTOMY Bilateral 2018    SPLENECTOMY      TONSILLECTOMY, ADENOIDECTOMY      TUBAL LIGATION  1986      Outpatient Encounter Medications as of 6/14/2023   Medication Sig Dispense Refill    ALPRAZolam (XANAX) 0.5 MG tablet TAKE ONE TABLET BY MOUTH AT BEDTIME 30 tablet 5    CALCIUM 500 500 mg calcium (1,250 mg) chewable tablet Take 1 tablet by mouth.      calcium-vitamin D3 (OS-KENA 500 + D3) 500 mg-5 mcg (200 unit) per tablet Take 1 tablet by mouth Daily.      dexAMETHasone (DECADRON) 4 MG Tab 0.5 mg once daily.      gabapentin (NEURONTIN) 100 MG capsule Take 100 mg by mouth 2 (two) times daily. Patient is taking once daily      levETIRAcetam (KEPPRA) 500  MG Tab Take 1 tablet (500 mg total) by mouth every 12 (twelve) hours. 60 tablet 11    lisinopriL 10 MG tablet Take 10 mg by mouth once daily.      loratadine (CLARITIN REDITABS) 10 mg dissolvable tablet Take 10 mg by mouth Daily.      metoprolol succinate (TOPROL-XL) 100 MG 24 hr tablet TAKE ONE TABLET BY MOUTH EVERY DAY 90 tablet 3    warfarin (COUMADIN) 4 MG tablet Take 4 mg by mouth Daily.      gabapentin (NEURONTIN) 400 MG capsule Take 400 mg by mouth 3 (three) times daily.      HYDROcodone-acetaminophen (NORCO) 7.5-325 mg per tablet Take 1 tablet by mouth every 4 (four) hours as needed for Pain. (Patient not taking: Reported on 2023) 42 tablet 0    letrozole (FEMARA) 2.5 mg Tab letrozole 2.5 mg tablet      loperamide (IMODIUM) 2 mg capsule Take 2 mg by mouth daily as needed.      metoprolol succinate (TOPROL-XL) 25 MG 24 hr tablet Take 25 mg by mouth once daily.      nystatin (MYCOSTATIN) 100,000 unit/mL suspension Take 500,000 Units by mouth.      oxyCODONE-acetaminophen (PERCOCET) 5-325 mg per tablet Take 1 tablet by mouth every 4 (four) hours as needed.      sucralfate (CARAFATE) 100 mg/mL suspension Take 10 mLs (1 g total) by mouth 4 (four) times daily before meals and nightly. 414 mL 3    [] triamcinolone acetonide 0.1% (KENALOG) 0.1 % paste Place onto teeth.      [DISCONTINUED] pantoprazole (PROTONIX) 40 MG tablet Take 1 tablet (40 mg total) by mouth once daily. 30 tablet 11     No facility-administered encounter medications on file as of 2023.      Review of patient's allergies indicates:  No Known Allergies   Social History     Tobacco Use    Smoking status: Every Day     Packs/day: 0.50     Types: Cigarettes    Smokeless tobacco: Never   Substance Use Topics    Alcohol use: Not Currently     Comment: occasionally      Family History   Problem Relation Age of Onset    Heart disease Mother        Review of Systems:  Constitutional:  Negative for chills and fever.   HENT:  Negative for  "nosebleeds and sore throat.    Eyes:  Negative for pain and visual disturbance.   Respiratory:  Negative for cough, chest tightness and shortness of breath.    Cardiovascular:  Negative for chest pain.   Gastrointestinal:  Negative for diarrhea, nausea and vomiting.   Genitourinary:  Negative for difficulty urinating, dysuria and hematuria.   Musculoskeletal:  Negative for gait problem, myalgias and neck pain.   Skin:  Negative for rash.   Neurological:  Positive for dizziness, weakness, numbness and headaches. Negative for facial asymmetry.   Psychiatric/Behavioral:  Negative for confusion and sleep disturbance. The patient is not nervous/anxious.      OBJECTIVE:     Vital Signs  Pulse: 69  BP: 111/73  Height: 5' 8" (172.7 cm)  Weight: 46.7 kg (103 lb)  Body mass index is 15.66 kg/m².    General:  alert, no distress, cooperative    Head:  Normocephalic, without obvious abnormality, atraumatic    Lungs:   Breathing is quiet, non-lablored    Neurological:    Oriented to Person, Place, Time   Speech:  normal  Memory, cognition, and affect are appropriate.  Extraocular movements are intact.  Movements of facial expression are intact and symmetric.  Motor Strength: Moves all extremities spontaneously with good tone.  No abnormal movements seen.    Gait is broad based    Follow-up MRI shows progression around the resection cavity as well as in other areas.  She is scheduled for whole-brain radiotherapy.    ASSESSMENT/PLAN:     1. Brain mass     - Follow up after next MRI (late July/August)        I, Dr. Theron Gomez, personally performed the services described in this documentation. All medical record entries made by the scribe, Nanda Oliveira RN, were at my direction and in my presence.  I have reviewed the chart and agree that the record reflects my personal performance and is accurate and complete. Theron Gomez MD.  4:52 PM 06/14/2023       Theron Gomez MD [facility]     "

## 2023-07-18 ENCOUNTER — TELEPHONE (OUTPATIENT)
Dept: NEUROSURGERY | Facility: CLINIC | Age: 68
End: 2023-07-18
Payer: MEDICARE

## 2023-07-18 DIAGNOSIS — G72.0 STEROID-INDUCED MYOPATHY: Primary | ICD-10-CM

## 2023-07-18 DIAGNOSIS — T38.0X5A STEROID-INDUCED MYOPATHY: Primary | ICD-10-CM

## 2023-07-18 NOTE — TELEPHONE ENCOUNTER
I spoke to the patient yesterday.  She complains of pain and weakness in her quads she is having difficulty standing from a seated position.  She recently tapered off of steroids.  The weakness began before she was completely off of the steroids.    I passed Dr. López in the hallway and discussed Salma with her.  She feels it is myopathy from the steroids.  I talked to the patient today.  She has continued with weakness.  She feels very unsteady with her gait.  We talked about resuming exercises she had done with physical therapy.  However, I feel it is important she undergo organized therapy.  She will start the home exercise program now.  [facility]

## 2023-07-20 PROCEDURE — G0180 MD CERTIFICATION HHA PATIENT: HCPCS | Mod: ,,, | Performed by: NEUROLOGICAL SURGERY

## 2023-07-20 PROCEDURE — G0180 PR HOME HEALTH MD CERTIFICATION: ICD-10-PCS | Mod: ,,, | Performed by: NEUROLOGICAL SURGERY

## 2023-07-28 ENCOUNTER — EXTERNAL HOME HEALTH (OUTPATIENT)
Dept: HOME HEALTH SERVICES | Facility: HOSPITAL | Age: 68
End: 2023-07-28
Payer: MEDICARE

## 2023-07-31 ENCOUNTER — TELEPHONE (OUTPATIENT)
Dept: NEUROSURGERY | Facility: CLINIC | Age: 68
End: 2023-07-31
Payer: MEDICARE

## 2023-07-31 DIAGNOSIS — D49.6 NEOPLASM OF BRAIN CAUSING MASS EFFECT ON ADJACENT STRUCTURES: Primary | ICD-10-CM

## 2023-07-31 NOTE — TELEPHONE ENCOUNTER
----- Message from Myriam Nicole sent at 7/31/2023 11:32 AM CDT -----  Regarding: Reschedkailash Ballard with [facility] called and requested patient's appoint be rescheduled to before 08/18/2023. Nellie gave me a call back number [phone]. Please advise.

## 2023-07-31 NOTE — TELEPHONE ENCOUNTER
Spoke with Nellie at [facility] and she wasn't sure why the patient didn't come to her appt that was originally scheduled, but stated that she needs to be seen before 08/18. I scheduled patient 08/12 and will fax her MRI to [facility] to be done prior. Nellie was very grateful.

## 2023-08-01 NOTE — DISCHARGE INSTRUCTIONS
-Keep incision dressed until 12/9/2022. OK to remove dressing on 12/9/2022 and begin showering/getting incision wet, leave open to air.   -Call with any incisional redness, swelling or drainage.   -Call with any worsening/uncontrolled pain, weakness, excessive sleepiness, etc.  -Resume Coumadin tomorrow, 12/8/2022    -Taper decadron as follows:   -4mg twice a day on 12/7-12/10   -2mg three times a day on 12/11-12/14   -2mg twice a day on 12/15-/12/17   -2mg once a day on 12/1-12/20, then stop   -call with any new/worsening symptoms while weaning steroids  
No Vaccines Administered.

## 2023-08-03 NOTE — TELEPHONE ENCOUNTER
Called [facility] and asked if they had ordered the MRI or if we needed to and I was told they just ordered it and it is scheduled for 08/17; I asked if they would happen to know why [facility] wanted her appt prior to 08/18 and they had no clue. I called and asked the pt also if she would know and she stated they're just nosey and need to stay out of her appts and I apologized. I told her I would r/s her back to around when her original appt was and told her if she had any complaints to call and I would help her with anything I could, she was very grateful with this. We scheduled her to 08/23 for noon.

## 2023-08-23 ENCOUNTER — OFFICE VISIT (OUTPATIENT)
Dept: NEUROSURGERY | Facility: CLINIC | Age: 68
End: 2023-08-23
Payer: MEDICARE

## 2023-08-23 VITALS
DIASTOLIC BLOOD PRESSURE: 72 MMHG | RESPIRATION RATE: 16 BRPM | WEIGHT: 136 LBS | BODY MASS INDEX: 20.61 KG/M2 | SYSTOLIC BLOOD PRESSURE: 112 MMHG | HEIGHT: 68 IN | HEART RATE: 75 BPM

## 2023-08-23 DIAGNOSIS — C79.31 METASTASIS TO BRAIN: Primary | ICD-10-CM

## 2023-08-23 PROCEDURE — 99213 OFFICE O/P EST LOW 20 MIN: CPT | Mod: ,,, | Performed by: NEUROLOGICAL SURGERY

## 2023-08-23 PROCEDURE — 99213 PR OFFICE/OUTPT VISIT, EST, LEVL III, 20-29 MIN: ICD-10-PCS | Mod: ,,, | Performed by: NEUROLOGICAL SURGERY

## 2023-08-23 RX ORDER — OXYCODONE AND ACETAMINOPHEN 5; 325 MG/1; MG/1
1 TABLET ORAL EVERY 4 HOURS PRN
COMMUNITY
Start: 2023-08-21

## 2023-08-23 RX ORDER — PANTOPRAZOLE SODIUM 40 MG/1
40 TABLET, DELAYED RELEASE ORAL DAILY
COMMUNITY
Start: 2023-06-28

## 2023-08-23 RX ORDER — ONDANSETRON 4 MG/1
4 TABLET, FILM COATED ORAL EVERY 8 HOURS PRN
COMMUNITY
Start: 2023-07-11

## 2023-08-23 NOTE — PROGRESS NOTES
[facility]  Neurosurgery  Established Patient      Kaykay Crespo   [id number]   1955       SUBJECTIVE:     History of Present Illness:  Patient is a 68 y.o. female who presents for a follow up for brain mets.  She is s/p right temporo-occipital craniotomy for excision of metastatic tumor that was done on 12/6/22.  She completed CyberKnife treatment of the resection cavity on 1/11/23.  She was doing okay until May when she began to have ringing in the left ear, decreased hearing and worsening dizziness/balance difficulty.  Follow up MRI showed progression around the resection cavity as well as in other areas.  She was treated with whole brain radiation, which she completed on 6/21/23.  She is here today for follow up with post treatment MRI performed 8/17/23.    She was instructed to resume her oral steroids two days ago.  She was experiencing pain in the thighs prior to this, but says it improved with the Decadron.  She is taking 4mg in the morning and at noon.  Dr. Euceda instructed her not to take the second dose in the evening due to difficulty sleeping.  She continues with decreased hearing on the left and problems with balance.  She has not had much of an appetite lately, but this has improved some with the steroids.  She says that things do not taste the way they are supposed to.  She says her left eye is bothering her.  She is having a hard time seeing to read and has to squint now.  She has not seen much improvement in this with the steroids.  She is scheduled to follow up with Dr. Euceda in one week.  She will follow up with Dr. López in one month.  She is not sure if she is planning another MRI at that time.        Past Medical History:   Diagnosis Date    Anxiety disorder, unspecified     Cerebellar mass     Compression fracture of L1 lumbar vertebra     History of DVT (deep vein thrombosis)     HLD (hyperlipidemia)     HTN (hypertension)     Metastatic breast cancer      Osteoporosis     PONV (postoperative nausea and vomiting)       Past Surgical History:   Procedure Laterality Date    BONE BIOPSY      CATARACT EXTRACTION Left     COLONOSCOPY      CRANIOTOMY, WITH NEOPLASM EXCISION USING COMPUTER-ASSISTED NAVIGATION Right 12/06/2022    posterior temporal occipital craniotomy for tumor.  Dr. Gomez    ESOPHAGOGASTRODUODENOSCOPY      LASIK Bilateral     MASTECTOMY Right     with lymph nodes    OOPHORECTOMY Bilateral 2018    SPLENECTOMY      TONSILLECTOMY, ADENOIDECTOMY      TUBAL LIGATION  1986      Outpatient Encounter Medications as of 8/23/2023   Medication Sig Dispense Refill    ALPRAZolam (XANAX) 0.5 MG tablet TAKE ONE TABLET BY MOUTH AT BEDTIME 30 tablet 5    CALCIUM 500 500 mg calcium (1,250 mg) chewable tablet Take 1 tablet by mouth.      calcium-vitamin D3 (OS-KENA 500 + D3) 500 mg-5 mcg (200 unit) per tablet Take 1 tablet by mouth Daily.      dexAMETHasone (DECADRON) 4 MG Tab 0.5 mg once daily.      gabapentin (NEURONTIN) 100 MG capsule Take 100 mg by mouth 2 (two) times daily. Patient is taking once daily      letrozole (FEMARA) 2.5 mg Tab letrozole 2.5 mg tablet      levETIRAcetam (KEPPRA) 500 MG Tab Take 1 tablet (500 mg total) by mouth every 12 (twelve) hours. 60 tablet 11    lisinopriL 10 MG tablet Take 10 mg by mouth once daily.      loratadine (CLARITIN REDITABS) 10 mg dissolvable tablet Take 10 mg by mouth Daily.      metoprolol succinate (TOPROL-XL) 100 MG 24 hr tablet TAKE ONE TABLET BY MOUTH EVERY DAY 90 tablet 3    ondansetron (ZOFRAN) 4 MG tablet Take 4 mg by mouth every 8 (eight) hours as needed.      oxyCODONE-acetaminophen (PERCOCET) 5-325 mg per tablet Take 1 tablet by mouth every 4 (four) hours as needed.      pantoprazole (PROTONIX) 40 MG tablet Take 40 mg by mouth Daily.      warfarin (COUMADIN) 4 MG tablet Take 4 mg by mouth Daily.       No facility-administered encounter medications on file as of 8/23/2023.      Review of patient's allergies  "indicates:  No Known Allergies   Social History     Tobacco Use    Smoking status: Every Day     Current packs/day: 0.50     Types: Cigarettes    Smokeless tobacco: Never   Substance Use Topics    Alcohol use: Not Currently     Comment: occasionally      Family History   Problem Relation Age of Onset    Heart disease Mother        Review of Systems:  Constitutional:  Negative for chills and fever.   HENT:  Negative for nosebleeds and sore throat.    Eyes:  Negative for pain and visual disturbance.   Respiratory:  Negative for cough, chest tightness and shortness of breath.    Cardiovascular:  Negative for chest pain.   Gastrointestinal:  Negative for diarrhea, nausea and vomiting.   Genitourinary:  Negative for difficulty urinating, dysuria and hematuria.   Musculoskeletal:  Negative for gait problem, myalgias and neck pain.   Skin:  Negative for rash.   Neurological:  Positive for dizziness, weakness, numbness and headaches. Negative for facial asymmetry.   Psychiatric/Behavioral:  Negative for confusion and sleep disturbance. The patient is not nervous/anxious.      OBJECTIVE:     Vital Signs  Pulse: 75  Resp: 16  BP: 112/72  Pain Score: 0-No pain  Height: 5' 8" (172.7 cm)  Weight: 61.7 kg (136 lb)  Body mass index is 20.68 kg/m².    General:  alert, no distress, cooperative    Head:  Normocephalic, without obvious abnormality, atraumatic    Lungs:   Breathing is quiet, non-lablored    Neurological:    Oriented to Person, Place, Time   Speech:  normal  Memory, cognition, and affect are appropriate.  Extraocular movements are intact.  Movements of facial expression are intact and symmetric.  Motor Strength: Moves all extremities spontaneously with good tone.  No abnormal movements seen.    Gait is broad based    MRI from 08/17/2023 shows some enlargement of most of the dural-based metastases.  The operative bed is stable minimal enhancement.  There is increasing edema.    ASSESSMENT/PLAN:     1. Metastasis to " brain    - Decrease Keppra to once a day for one week then stop   - Follow up after next MRI ordered by [facility]        I, Dr. Theron Gomez, personally performed the services described in this documentation. All medical record entries made by the scribe, Nanda Oliveira RN, were at my direction and in my presence.  I have reviewed the chart and agree that the record reflects my personal performance and is accurate and complete. Theron Gomez MD.  4:52 PM 08/23/2023       Theron Gomez MD [facility]

## 2023-08-26 ENCOUNTER — DOCUMENT SCAN (OUTPATIENT)
Dept: HOME HEALTH SERVICES | Facility: HOSPITAL | Age: 68
End: 2023-08-26
Payer: MEDICARE

## 2023-08-28 ENCOUNTER — DOCUMENT SCAN (OUTPATIENT)
Dept: HOME HEALTH SERVICES | Facility: HOSPITAL | Age: 68
End: 2023-08-28
Payer: MEDICARE

## 2024-10-19 NOTE — TELEPHONE ENCOUNTER
All of the patients images are in pacs and being transferred over to [facility].  I spoke to the patient and scheduled her with Dr. Goemz on Monday at 2:00.  Does she need any more testing prior?  Please advise...BH   Pt got to the floor at 2314   Transferred from the ED for RUQ pain.  RN went through belongings - list of things is in the pt chart .  Multiple shirts, pants, jackets, slippers, bags. Planner, bible, water bottle, water glass, makeup, cell phone / , home medications, vape, credit card and cash. Currently locked in the closet in the pt room.   Four eye with Vesna - PCNA, skin intact.     Cristy SILVA     Problem: Pain - Adult  Goal: Verbalizes/displays adequate comfort level or baseline comfort level  10/19/2024 0537 by Cristy Marie RN  Outcome: Progressing  10/19/2024 0100 by Cristy Marie RN  Outcome: Progressing     Problem: Safety - Adult  Goal: Free from fall injury  10/19/2024 0537 by Cristy Marie RN  Outcome: Progressing  10/19/2024 0100 by Cristy Marie RN  Outcome: Progressing

## (undated) DEVICE — Device

## (undated) DEVICE — DRAPE OPMI STERILE

## (undated) DEVICE — SEE MEDLINE ITEM 152713

## (undated) DEVICE — CASSETTE SONOPET IQ IRRIGATION

## (undated) DEVICE — TUBING SILICON CLR 3/16IN 10FT

## (undated) DEVICE — SPONGE CDMN CLLGN BICOL 4X3IN

## (undated) DEVICE — SUT VICRYL 2-0 8-18 CP-2

## (undated) DEVICE — CONTAINER SPECIMEN SCREW 4OZ

## (undated) DEVICE — POSITIONER HEAD ADULT

## (undated) DEVICE — SOL .9NACL PF 100 ML

## (undated) DEVICE — DURAPREP SURG SCRUB 26ML

## (undated) DEVICE — ELECTRODE PATIENT RETURN DISP

## (undated) DEVICE — MARKER WRITESITE SKIN CHLRAPRP

## (undated) DEVICE — GLOVE PROTEXIS PI SYN SURG 7.5

## (undated) DEVICE — GAUZE SPONGE 4X4 12PLY

## (undated) DEVICE — COVER HD BACK TABLE 6FT

## (undated) DEVICE — SPHERE NDI PASSIVE

## (undated) DEVICE — DISH PETRI MED 3.5IN

## (undated) DEVICE — GLOVE PROTEXIS PI SYN SURG 6.5

## (undated) DEVICE — TAPE CLOTH MEDIPORE SOFT 2X2YD

## (undated) DEVICE — GLOVE PROTEXIS BLUE LATEX 6.5

## (undated) DEVICE — GLOVE PROTEXIS BLUE LATEX 8

## (undated) DEVICE — SUT VICRYL PLUS 4-0 TF 18IN

## (undated) DEVICE — CLIP SCALP SCALPFIX MAGAZINE

## (undated) DEVICE — TOWEL OR BLUE STRL 16X26 4/PK

## (undated) DEVICE — SPONGE SURGIFOAM 100 8.5X12X10

## (undated) DEVICE — ADHESIVE MASTISOL VIAL 48/BX

## (undated) DEVICE — TOOL MR8 F2 TAPER 7CM 2.3MM

## (undated) DEVICE — BLADE CLIPPER NEURO / MDL 4411

## (undated) DEVICE — POSITIONER HEEL FOAM CONVOLTD

## (undated) DEVICE — SEE L#804

## (undated) DEVICE — PERFORATOR SURG CRAN 14X11MM

## (undated) DEVICE — PACK VARISPREED BATTERY

## (undated) DEVICE — HEMOSTAT SURGICEL 2X14IN

## (undated) DEVICE — DRESSING TRANS 2X2 TEGADERM

## (undated) DEVICE — GLOVE PROTEXIS PI SYN SURG 6.0

## (undated) DEVICE — DRAPE FLUID WARMER 44X44IN

## (undated) DEVICE — DRAIN JP STD PENROSE 1/4X12IN

## (undated) DEVICE — TUBE SUCTION MEDI-VAC STERILE

## (undated) DEVICE — TIP SONAPET IQ STANDARD 12CM

## (undated) DEVICE — SEE MEDLINE ITEM 157103

## (undated) DEVICE — GOWN SMARTSLEEVE AAMI LVL4 LG

## (undated) DEVICE — DRESSING TELFA N ADH 3X8

## (undated) DEVICE — SEALANT ADHERUS DURAL AUTOSPRY

## (undated) DEVICE — NDL HYPO 22GX1 1/2 SYR 10ML LL

## (undated) DEVICE — TRAY CATH FOL SIL URIMTR 16FR

## (undated) DEVICE — BAND RUBBER STERILE 1/4X3.5IN

## (undated) DEVICE — SUT BONE WAX 2.5 GRMS 12/BX

## (undated) DEVICE — HEMOSTAT SURGICEL FIBRLR 2X4IN

## (undated) DEVICE — KIT SURGIFLO HEMOSTATIC MATRIX

## (undated) DEVICE — GLOVE PROTEXIS BLUE LATEX 7

## (undated) DEVICE — SUT VICRYL PLUS 3-0 SH 18IN

## (undated) DEVICE — TOOL MR8 TAPER 7CM 1.1MM